# Patient Record
Sex: FEMALE | Race: WHITE | Employment: FULL TIME | URBAN - METROPOLITAN AREA
[De-identification: names, ages, dates, MRNs, and addresses within clinical notes are randomized per-mention and may not be internally consistent; named-entity substitution may affect disease eponyms.]

---

## 2023-06-22 ENCOUNTER — HOSPITAL ENCOUNTER (OUTPATIENT)
Facility: HOSPITAL | Age: 48
Discharge: HOME | End: 2023-06-22
Payer: COMMERCIAL

## 2023-06-22 DIAGNOSIS — R10.2 PELVIC PAIN IN FEMALE: ICD-10-CM

## 2023-06-22 LAB
BASOPHILS # BLD: 0 K/UL (ref 0–0.1)
BASOPHILS NFR BLD: 0 % (ref 0–2)
DIFFERENTIAL METHOD BLD: ABNORMAL
EOSINOPHIL # BLD: 0.1 K/UL (ref 0–0.4)
EOSINOPHIL NFR BLD: 2 % (ref 0–5)
ERYTHROCYTE [DISTWIDTH] IN BLOOD BY AUTOMATED COUNT: 11.9 % (ref 11.6–14.5)
HCT VFR BLD AUTO: 36.1 % (ref 35–45)
HGB BLD-MCNC: 11.9 G/DL (ref 12–16)
IMM GRANULOCYTES # BLD AUTO: 0 K/UL (ref 0–0.04)
IMM GRANULOCYTES NFR BLD AUTO: 0 % (ref 0–0.5)
LYMPHOCYTES # BLD: 2.3 K/UL (ref 0.9–3.6)
LYMPHOCYTES NFR BLD: 38 % (ref 21–52)
MCH RBC QN AUTO: 29.5 PG (ref 24–34)
MCHC RBC AUTO-ENTMCNC: 33 G/DL (ref 31–37)
MCV RBC AUTO: 89.6 FL (ref 78–100)
MONOCYTES # BLD: 0.6 K/UL (ref 0.05–1.2)
MONOCYTES NFR BLD: 10 % (ref 3–10)
NEUTS SEG # BLD: 2.9 K/UL (ref 1.8–8)
NEUTS SEG NFR BLD: 48 % (ref 40–73)
NRBC # BLD: 0 K/UL (ref 0–0.01)
NRBC BLD-RTO: 0 PER 100 WBC
PLATELET # BLD AUTO: 209 K/UL (ref 135–420)
PMV BLD AUTO: 10.4 FL (ref 9.2–11.8)
RBC # BLD AUTO: 4.03 M/UL (ref 4.2–5.3)
WBC # BLD AUTO: 6.1 K/UL (ref 4.6–13.2)

## 2023-06-22 PROCEDURE — 85025 COMPLETE CBC W/AUTO DIFF WBC: CPT

## 2023-06-22 PROCEDURE — 36415 COLL VENOUS BLD VENIPUNCTURE: CPT

## 2023-07-07 ENCOUNTER — ANESTHESIA EVENT (OUTPATIENT)
Facility: HOSPITAL | Age: 48
End: 2023-07-07
Payer: COMMERCIAL

## 2023-07-10 ENCOUNTER — ANESTHESIA (OUTPATIENT)
Facility: HOSPITAL | Age: 48
End: 2023-07-10
Payer: COMMERCIAL

## 2023-07-10 ENCOUNTER — HOSPITAL ENCOUNTER (OUTPATIENT)
Facility: HOSPITAL | Age: 48
Setting detail: OUTPATIENT SURGERY
Discharge: HOME | End: 2023-07-10
Attending: OBSTETRICS & GYNECOLOGY | Admitting: OBSTETRICS & GYNECOLOGY
Payer: COMMERCIAL

## 2023-07-10 VITALS
RESPIRATION RATE: 18 BRPM | HEART RATE: 62 BPM | SYSTOLIC BLOOD PRESSURE: 111 MMHG | TEMPERATURE: 97.9 F | OXYGEN SATURATION: 100 % | DIASTOLIC BLOOD PRESSURE: 65 MMHG | WEIGHT: 170.2 LBS | BODY MASS INDEX: 25.79 KG/M2 | HEIGHT: 68 IN

## 2023-07-10 LAB
ABO + RH BLD: NORMAL
BLOOD GROUP ANTIBODIES SERPL: NORMAL
HCG UR QL: NEGATIVE
SPECIMEN EXP DATE BLD: NORMAL

## 2023-07-10 PROCEDURE — 7100000000 HC PACU RECOVERY - FIRST 15 MIN: Performed by: OBSTETRICS & GYNECOLOGY

## 2023-07-10 PROCEDURE — 3700000000 HC ANESTHESIA ATTENDED CARE: Performed by: OBSTETRICS & GYNECOLOGY

## 2023-07-10 PROCEDURE — 86900 BLOOD TYPING SEROLOGIC ABO: CPT

## 2023-07-10 PROCEDURE — 6360000002 HC RX W HCPCS: Performed by: OBSTETRICS & GYNECOLOGY

## 2023-07-10 PROCEDURE — 2709999900 HC NON-CHARGEABLE SUPPLY: Performed by: OBSTETRICS & GYNECOLOGY

## 2023-07-10 PROCEDURE — 2500000003 HC RX 250 WO HCPCS: Performed by: NURSE ANESTHETIST, CERTIFIED REGISTERED

## 2023-07-10 PROCEDURE — 2720000010 HC SURG SUPPLY STERILE: Performed by: OBSTETRICS & GYNECOLOGY

## 2023-07-10 PROCEDURE — 6360000002 HC RX W HCPCS: Performed by: NURSE ANESTHETIST, CERTIFIED REGISTERED

## 2023-07-10 PROCEDURE — A4217 STERILE WATER/SALINE, 500 ML: HCPCS | Performed by: OBSTETRICS & GYNECOLOGY

## 2023-07-10 PROCEDURE — 88307 TISSUE EXAM BY PATHOLOGIST: CPT

## 2023-07-10 PROCEDURE — 86850 RBC ANTIBODY SCREEN: CPT

## 2023-07-10 PROCEDURE — 6360000002 HC RX W HCPCS: Performed by: STUDENT IN AN ORGANIZED HEALTH CARE EDUCATION/TRAINING PROGRAM

## 2023-07-10 PROCEDURE — 2580000003 HC RX 258: Performed by: OBSTETRICS & GYNECOLOGY

## 2023-07-10 PROCEDURE — 36415 COLL VENOUS BLD VENIPUNCTURE: CPT

## 2023-07-10 PROCEDURE — 3700000001 HC ADD 15 MINUTES (ANESTHESIA): Performed by: OBSTETRICS & GYNECOLOGY

## 2023-07-10 PROCEDURE — 81025 URINE PREGNANCY TEST: CPT

## 2023-07-10 PROCEDURE — 2580000003 HC RX 258: Performed by: NURSE ANESTHETIST, CERTIFIED REGISTERED

## 2023-07-10 PROCEDURE — 3600000005 HC SURGERY LEVEL 5 BASE: Performed by: OBSTETRICS & GYNECOLOGY

## 2023-07-10 PROCEDURE — 6370000000 HC RX 637 (ALT 250 FOR IP): Performed by: OBSTETRICS & GYNECOLOGY

## 2023-07-10 PROCEDURE — 3600000015 HC SURGERY LEVEL 5 ADDTL 15MIN: Performed by: OBSTETRICS & GYNECOLOGY

## 2023-07-10 PROCEDURE — 7100000010 HC PHASE II RECOVERY - FIRST 15 MIN: Performed by: OBSTETRICS & GYNECOLOGY

## 2023-07-10 PROCEDURE — 86901 BLOOD TYPING SEROLOGIC RH(D): CPT

## 2023-07-10 PROCEDURE — 7100000011 HC PHASE II RECOVERY - ADDTL 15 MIN: Performed by: OBSTETRICS & GYNECOLOGY

## 2023-07-10 PROCEDURE — 7100000001 HC PACU RECOVERY - ADDTL 15 MIN: Performed by: OBSTETRICS & GYNECOLOGY

## 2023-07-10 RX ORDER — HYDROMORPHONE HYDROCHLORIDE 1 MG/ML
0.5 INJECTION, SOLUTION INTRAMUSCULAR; INTRAVENOUS; SUBCUTANEOUS EVERY 5 MIN PRN
Status: DISCONTINUED | OUTPATIENT
Start: 2023-07-10 | End: 2023-07-10 | Stop reason: HOSPADM

## 2023-07-10 RX ORDER — SODIUM CHLORIDE, SODIUM LACTATE, POTASSIUM CHLORIDE, CALCIUM CHLORIDE 600; 310; 30; 20 MG/100ML; MG/100ML; MG/100ML; MG/100ML
INJECTION, SOLUTION INTRAVENOUS CONTINUOUS
Status: DISCONTINUED | OUTPATIENT
Start: 2023-07-10 | End: 2023-07-10 | Stop reason: HOSPADM

## 2023-07-10 RX ORDER — HYDROMORPHONE HYDROCHLORIDE 1 MG/ML
INJECTION, SOLUTION INTRAMUSCULAR; INTRAVENOUS; SUBCUTANEOUS PRN
Status: DISCONTINUED | OUTPATIENT
Start: 2023-07-10 | End: 2023-07-10 | Stop reason: SDUPTHER

## 2023-07-10 RX ORDER — SODIUM CHLORIDE 9 MG/ML
INJECTION, SOLUTION INTRAVENOUS PRN
Status: DISCONTINUED | OUTPATIENT
Start: 2023-07-10 | End: 2023-07-10 | Stop reason: HOSPADM

## 2023-07-10 RX ORDER — ONDANSETRON 2 MG/ML
INJECTION INTRAMUSCULAR; INTRAVENOUS PRN
Status: DISCONTINUED | OUTPATIENT
Start: 2023-07-10 | End: 2023-07-10 | Stop reason: SDUPTHER

## 2023-07-10 RX ORDER — OXYCODONE HYDROCHLORIDE 5 MG/1
10 TABLET ORAL EVERY 4 HOURS PRN
Status: DISCONTINUED | OUTPATIENT
Start: 2023-07-10 | End: 2023-07-10 | Stop reason: HOSPADM

## 2023-07-10 RX ORDER — MIDAZOLAM HYDROCHLORIDE 1 MG/ML
INJECTION INTRAMUSCULAR; INTRAVENOUS PRN
Status: DISCONTINUED | OUTPATIENT
Start: 2023-07-10 | End: 2023-07-10 | Stop reason: SDUPTHER

## 2023-07-10 RX ORDER — MAGNESIUM HYDROXIDE/ALUMINUM HYDROXICE/SIMETHICONE 120; 1200; 1200 MG/30ML; MG/30ML; MG/30ML
30 SUSPENSION ORAL EVERY 6 HOURS PRN
Status: DISCONTINUED | OUTPATIENT
Start: 2023-07-10 | End: 2023-07-10 | Stop reason: HOSPADM

## 2023-07-10 RX ORDER — DEXAMETHASONE SODIUM PHOSPHATE 4 MG/ML
INJECTION, SOLUTION INTRA-ARTICULAR; INTRALESIONAL; INTRAMUSCULAR; INTRAVENOUS; SOFT TISSUE PRN
Status: DISCONTINUED | OUTPATIENT
Start: 2023-07-10 | End: 2023-07-10 | Stop reason: SDUPTHER

## 2023-07-10 RX ORDER — FENTANYL CITRATE 50 UG/ML
INJECTION, SOLUTION INTRAMUSCULAR; INTRAVENOUS PRN
Status: DISCONTINUED | OUTPATIENT
Start: 2023-07-10 | End: 2023-07-10 | Stop reason: SDUPTHER

## 2023-07-10 RX ORDER — KETOROLAC TROMETHAMINE 15 MG/ML
INJECTION, SOLUTION INTRAMUSCULAR; INTRAVENOUS PRN
Status: DISCONTINUED | OUTPATIENT
Start: 2023-07-10 | End: 2023-07-10 | Stop reason: SDUPTHER

## 2023-07-10 RX ORDER — SODIUM CHLORIDE 0.9 % (FLUSH) 0.9 %
5-40 SYRINGE (ML) INJECTION PRN
Status: DISCONTINUED | OUTPATIENT
Start: 2023-07-10 | End: 2023-07-10 | Stop reason: HOSPADM

## 2023-07-10 RX ORDER — DROPERIDOL 2.5 MG/ML
0.62 INJECTION, SOLUTION INTRAMUSCULAR; INTRAVENOUS
Status: COMPLETED | OUTPATIENT
Start: 2023-07-10 | End: 2023-07-10

## 2023-07-10 RX ORDER — ONDANSETRON 2 MG/ML
4 INJECTION INTRAMUSCULAR; INTRAVENOUS EVERY 6 HOURS PRN
Status: DISCONTINUED | OUTPATIENT
Start: 2023-07-10 | End: 2023-07-10 | Stop reason: HOSPADM

## 2023-07-10 RX ORDER — ONDANSETRON 2 MG/ML
4 INJECTION INTRAMUSCULAR; INTRAVENOUS
Status: DISCONTINUED | OUTPATIENT
Start: 2023-07-10 | End: 2023-07-10 | Stop reason: HOSPADM

## 2023-07-10 RX ORDER — METHYLENE BLUE 10 MG/ML
INJECTION INTRAVENOUS PRN
Status: DISCONTINUED | OUTPATIENT
Start: 2023-07-10 | End: 2023-07-10 | Stop reason: SDUPTHER

## 2023-07-10 RX ORDER — OXYCODONE HYDROCHLORIDE 5 MG/1
5 TABLET ORAL EVERY 4 HOURS PRN
Status: DISCONTINUED | OUTPATIENT
Start: 2023-07-10 | End: 2023-07-10 | Stop reason: HOSPADM

## 2023-07-10 RX ORDER — SODIUM CHLORIDE 0.9 % (FLUSH) 0.9 %
5-40 SYRINGE (ML) INJECTION EVERY 12 HOURS SCHEDULED
Status: DISCONTINUED | OUTPATIENT
Start: 2023-07-10 | End: 2023-07-10 | Stop reason: HOSPADM

## 2023-07-10 RX ORDER — FENTANYL CITRATE 50 UG/ML
50 INJECTION, SOLUTION INTRAMUSCULAR; INTRAVENOUS EVERY 5 MIN PRN
Status: DISCONTINUED | OUTPATIENT
Start: 2023-07-10 | End: 2023-07-10 | Stop reason: HOSPADM

## 2023-07-10 RX ORDER — LIDOCAINE HYDROCHLORIDE 20 MG/ML
INJECTION, SOLUTION EPIDURAL; INFILTRATION; INTRACAUDAL; PERINEURAL PRN
Status: DISCONTINUED | OUTPATIENT
Start: 2023-07-10 | End: 2023-07-10 | Stop reason: SDUPTHER

## 2023-07-10 RX ORDER — IBUPROFEN 400 MG/1
600 TABLET ORAL EVERY 8 HOURS SCHEDULED
Status: DISCONTINUED | OUTPATIENT
Start: 2023-07-10 | End: 2023-07-10 | Stop reason: HOSPADM

## 2023-07-10 RX ORDER — ROCURONIUM BROMIDE 10 MG/ML
INJECTION, SOLUTION INTRAVENOUS PRN
Status: DISCONTINUED | OUTPATIENT
Start: 2023-07-10 | End: 2023-07-10 | Stop reason: SDUPTHER

## 2023-07-10 RX ORDER — DOCUSATE SODIUM 100 MG/1
100 CAPSULE, LIQUID FILLED ORAL 2 TIMES DAILY
Status: DISCONTINUED | OUTPATIENT
Start: 2023-07-10 | End: 2023-07-10 | Stop reason: HOSPADM

## 2023-07-10 RX ORDER — ONDANSETRON 4 MG/1
4 TABLET, ORALLY DISINTEGRATING ORAL EVERY 8 HOURS PRN
Status: DISCONTINUED | OUTPATIENT
Start: 2023-07-10 | End: 2023-07-10 | Stop reason: HOSPADM

## 2023-07-10 RX ORDER — HYDROMORPHONE HYDROCHLORIDE 1 MG/ML
0.5 INJECTION, SOLUTION INTRAMUSCULAR; INTRAVENOUS; SUBCUTANEOUS
Status: DISCONTINUED | OUTPATIENT
Start: 2023-07-10 | End: 2023-07-10 | Stop reason: HOSPADM

## 2023-07-10 RX ORDER — SODIUM CHLORIDE, SODIUM LACTATE, POTASSIUM CHLORIDE, CALCIUM CHLORIDE 600; 310; 30; 20 MG/100ML; MG/100ML; MG/100ML; MG/100ML
INJECTION, SOLUTION INTRAVENOUS CONTINUOUS PRN
Status: DISCONTINUED | OUTPATIENT
Start: 2023-07-10 | End: 2023-07-10 | Stop reason: SDUPTHER

## 2023-07-10 RX ORDER — BUPIVACAINE HYDROCHLORIDE 2.5 MG/ML
INJECTION, SOLUTION EPIDURAL; INFILTRATION; INTRACAUDAL PRN
Status: DISCONTINUED | OUTPATIENT
Start: 2023-07-10 | End: 2023-07-10 | Stop reason: ALTCHOICE

## 2023-07-10 RX ORDER — GLYCOPYRROLATE 0.2 MG/ML
INJECTION INTRAMUSCULAR; INTRAVENOUS PRN
Status: DISCONTINUED | OUTPATIENT
Start: 2023-07-10 | End: 2023-07-10 | Stop reason: SDUPTHER

## 2023-07-10 RX ORDER — MAGNESIUM HYDROXIDE 1200 MG/15ML
LIQUID ORAL CONTINUOUS PRN
Status: COMPLETED | OUTPATIENT
Start: 2023-07-10 | End: 2023-07-10

## 2023-07-10 RX ORDER — PROPOFOL 10 MG/ML
INJECTION, EMULSION INTRAVENOUS PRN
Status: DISCONTINUED | OUTPATIENT
Start: 2023-07-10 | End: 2023-07-10 | Stop reason: SDUPTHER

## 2023-07-10 RX ORDER — LABETALOL HYDROCHLORIDE 5 MG/ML
10 INJECTION, SOLUTION INTRAVENOUS
Status: DISCONTINUED | OUTPATIENT
Start: 2023-07-10 | End: 2023-07-10 | Stop reason: HOSPADM

## 2023-07-10 RX ORDER — MIDAZOLAM HYDROCHLORIDE 2 MG/2ML
2 INJECTION, SOLUTION INTRAMUSCULAR; INTRAVENOUS
Status: DISCONTINUED | OUTPATIENT
Start: 2023-07-10 | End: 2023-07-10 | Stop reason: HOSPADM

## 2023-07-10 RX ORDER — DIPHENHYDRAMINE HYDROCHLORIDE 50 MG/ML
12.5 INJECTION INTRAMUSCULAR; INTRAVENOUS
Status: DISCONTINUED | OUTPATIENT
Start: 2023-07-10 | End: 2023-07-10 | Stop reason: HOSPADM

## 2023-07-10 RX ADMIN — OXYCODONE HYDROCHLORIDE 5 MG: 5 TABLET ORAL at 12:26

## 2023-07-10 RX ADMIN — ONDANSETRON HYDROCHLORIDE 4 MG: 2 INJECTION INTRAMUSCULAR; INTRAVENOUS at 10:44

## 2023-07-10 RX ADMIN — GLYCOPYRROLATE 0.2 MG: 0.2 INJECTION, SOLUTION INTRAMUSCULAR; INTRAVENOUS at 09:26

## 2023-07-10 RX ADMIN — SODIUM CHLORIDE, SODIUM LACTATE, POTASSIUM CHLORIDE, AND CALCIUM CHLORIDE: 600; 310; 30; 20 INJECTION, SOLUTION INTRAVENOUS at 10:08

## 2023-07-10 RX ADMIN — KETOROLAC TROMETHAMINE 30 MG: 15 INJECTION, SOLUTION INTRAMUSCULAR; INTRAVENOUS at 10:44

## 2023-07-10 RX ADMIN — Medication 2000 MG: at 09:07

## 2023-07-10 RX ADMIN — METHYLENE BLUE 100 MG: 10 INJECTION INTRAVENOUS at 10:21

## 2023-07-10 RX ADMIN — SODIUM CHLORIDE, SODIUM LACTATE, POTASSIUM CHLORIDE, AND CALCIUM CHLORIDE: 600; 310; 30; 20 INJECTION, SOLUTION INTRAVENOUS at 08:55

## 2023-07-10 RX ADMIN — GLYCOPYRROLATE 0.2 MG: 0.2 INJECTION, SOLUTION INTRAMUSCULAR; INTRAVENOUS at 09:40

## 2023-07-10 RX ADMIN — FENTANYL CITRATE 25 MCG: 50 INJECTION, SOLUTION INTRAMUSCULAR; INTRAVENOUS at 10:44

## 2023-07-10 RX ADMIN — DROPERIDOL 0.62 MG: 2.5 INJECTION, SOLUTION INTRAMUSCULAR; INTRAVENOUS at 11:48

## 2023-07-10 RX ADMIN — ROCURONIUM BROMIDE 50 MG: 10 INJECTION, SOLUTION INTRAVENOUS at 09:05

## 2023-07-10 RX ADMIN — HYDROMORPHONE HYDROCHLORIDE 0.5 MG: 1 INJECTION, SOLUTION INTRAMUSCULAR; INTRAVENOUS; SUBCUTANEOUS at 08:55

## 2023-07-10 RX ADMIN — ROCURONIUM BROMIDE 20 MG: 10 INJECTION, SOLUTION INTRAVENOUS at 10:18

## 2023-07-10 RX ADMIN — DEXAMETHASONE SODIUM PHOSPHATE 4 MG: 4 INJECTION, SOLUTION INTRAMUSCULAR; INTRAVENOUS at 09:04

## 2023-07-10 RX ADMIN — PROPOFOL 150 MG: 10 INJECTION, EMULSION INTRAVENOUS at 09:04

## 2023-07-10 RX ADMIN — FENTANYL CITRATE 25 MCG: 50 INJECTION, SOLUTION INTRAMUSCULAR; INTRAVENOUS at 09:53

## 2023-07-10 RX ADMIN — SUGAMMADEX 200 MG: 100 INJECTION, SOLUTION INTRAVENOUS at 10:33

## 2023-07-10 RX ADMIN — SODIUM CHLORIDE, POTASSIUM CHLORIDE, SODIUM LACTATE AND CALCIUM CHLORIDE: 600; 310; 30; 20 INJECTION, SOLUTION INTRAVENOUS at 11:49

## 2023-07-10 RX ADMIN — FENTANYL CITRATE 50 MCG: 50 INJECTION, SOLUTION INTRAMUSCULAR; INTRAVENOUS at 09:22

## 2023-07-10 RX ADMIN — LIDOCAINE HYDROCHLORIDE 60 MG: 20 INJECTION, SOLUTION EPIDURAL; INFILTRATION; INTRACAUDAL; PERINEURAL at 09:04

## 2023-07-10 RX ADMIN — MIDAZOLAM 2 MG: 1 INJECTION INTRAMUSCULAR; INTRAVENOUS at 08:55

## 2023-07-10 RX ADMIN — HYDROMORPHONE HYDROCHLORIDE 0.5 MG: 1 INJECTION, SOLUTION INTRAMUSCULAR; INTRAVENOUS; SUBCUTANEOUS at 09:26

## 2023-07-10 ASSESSMENT — PAIN - FUNCTIONAL ASSESSMENT
PAIN_FUNCTIONAL_ASSESSMENT: 0-10
PAIN_FUNCTIONAL_ASSESSMENT: PREVENTS OR INTERFERES SOME ACTIVE ACTIVITIES AND ADLS
PAIN_FUNCTIONAL_ASSESSMENT: PREVENTS OR INTERFERES SOME ACTIVE ACTIVITIES AND ADLS

## 2023-07-10 ASSESSMENT — PAIN DESCRIPTION - PAIN TYPE
TYPE: SURGICAL PAIN
TYPE: SURGICAL PAIN

## 2023-07-10 ASSESSMENT — PAIN DESCRIPTION - DESCRIPTORS
DESCRIPTORS: CRAMPING;ACHING
DESCRIPTORS: CRAMPING
DESCRIPTORS: CRAMPING

## 2023-07-10 ASSESSMENT — PAIN DESCRIPTION - FREQUENCY: FREQUENCY: INTERMITTENT

## 2023-07-10 ASSESSMENT — PAIN SCALES - GENERAL
PAINLEVEL_OUTOF10: 4
PAINLEVEL_OUTOF10: 5
PAINLEVEL_OUTOF10: 2
PAINLEVEL_OUTOF10: 0
PAINLEVEL_OUTOF10: 5

## 2023-07-10 ASSESSMENT — PAIN DESCRIPTION - LOCATION
LOCATION: ABDOMEN

## 2023-07-10 NOTE — PERIOP NOTE
Patient voided 200 mls of clear blue/gray urine due to methylene blue dye injection, without problems.

## 2023-07-10 NOTE — BRIEF OP NOTE
Brief Postoperative Note      Patient: Mendoza Segovia  YOB: 1975  MRN: [id number]    Date of Procedure: 7/10/2023    Pre-Op Diagnosis: menometteraghia    Post-Op Diagnosis: Same       Procedure(s):  LAPAROSCOPICALLY ASSISTED VAGINAL HYSTERECTOMY WITH BILATERAL SALPINGECTOMY/cystoscopy    Surgeon(s):  Andria Mandujano MD    Assistant:as per or record      Anesthesia: General    Estimated Blood Loss (mL): 926     Complications: None    Specimens:   ID Type Source Tests Collected by Time Destination   A : CERVIX, UTERUS, RIGHT FALLOPIAN TUBE Tissue Uterus SURGICAL PATHOLOGY Andria Mandujano MD 7/10/2023 7569        Implants:  * No implants in log *      Drains:   [REMOVED] NG/OG/NJ/NE Tube Orogastric 18 fr Center mouth (Removed)       [REMOVED] Urinary Catheter 07/10/23 2 Way (Removed)   Catheter Best Practices  Drainage tube clipped to bed;Catheter secured to thigh; Tamper seal intact; Bag below bladder;Drainage bag less than half full;Lack of dependent loop in tubing;Bag not on floor 07/10/23 0924       Findings: left tube absent possible left ov remnant right ovary tube normal /normal cystoscopy    Electronically signed by Suad Lopez MD on 7/10/2023 at 10:46 AM

## 2023-07-10 NOTE — PERIOP NOTE
Reviewed PTA medication list with patient/caregiver and patient/caregiver denies any additional medications. Patient admits to having a responsible adult care for them at home for at least 24 hours after surgery. Patient encouraged to use gown warming system and informed that using said warming gown to regulate body temperature prior to a procedure has been shown to help reduce the risks of blood clots and infection. Patient's pharmacy of choice verified and documented in PTA medication section. Dual skin assessment & fall risk band verification completed with A Jaswant UMANZOR.

## 2023-07-10 NOTE — DISCHARGE INSTRUCTIONS
stomach: Take your medicine after meals (unless your doctor has told you not to). Ask your doctor for a different pain medicine. If your doctor prescribed antibiotics, take them as directed. Do not stop taking them just because you feel better. You need to take the full course of antibiotics. Incision care    You may have stitches over the cuts (incisions) the doctor made in your belly. If you have strips of tape on the incisions the doctor made, leave the tape on for a week or until it falls off. Or follow your doctor's instructions for removing the tape. Wash the area daily with warm, soapy water, and pat it dry. Don't use hydrogen peroxide or alcohol, which can slow healing. You may cover the area with a gauze bandage if it weeps or rubs against clothing. Change the bandage every day. Keep the area clean and dry. Other instructions    You may have some light vaginal bleeding. Wear sanitary pads if needed. Do not douche or use tampons. Follow-up care is a key part of your treatment and safety. Be sure to make and go to all appointments, and call your doctor if you are having problems. It's also a good idea to know your test results and keep a list of the medicines you take. When should you call for help? Call 911 anytime you think you may need emergency care. For example, call if:    You passed out (lost consciousness). You have chest pain, are short of breath, or cough up blood. Call your doctor now or seek immediate medical care if:    You have pain that does not get better after you take pain medicine. You cannot pass stools or gas. You have vaginal discharge that has increased in amount or smells bad. You are sick to your stomach or cannot drink fluids. You have loose stitches, or your incision comes open. Bright red blood has soaked through the bandage over your incision. You have signs of infection, such as:   Increased pain, swelling, warmth, or

## 2023-07-10 NOTE — PERIOP NOTE
TRANSFER - IN REPORT:    Verbal report received from OR RN on Germán Brizuela  being received from OR for routine progression of patient care      Report consisted of patient's Situation, Background, Assessment and   Recommendations(SBAR). Information from the following report(s) Adult Overview, Surgery Report, Intake/Output, and MAR was reviewed with the receiving nurse. Opportunity for questions and clarification was provided. Assessment completed upon patient's arrival to unit and care assumed.

## 2023-07-10 NOTE — PERIOP NOTE
TRANSFER - IN REPORT:    Verbal report received from [facility] on [facility]  being received from PACU for routine post-op      Report consisted of patient's Situation, Background, Assessment and   Recommendations(SBAR). Information from the following report(s) Nurse Handoff Report, Surgery Report, Intake/Output, and MAR was reviewed with the receiving nurse. Opportunity for questions and clarification was provided. Assessment completed upon patient's arrival to unit and care assumed.

## 2023-07-10 NOTE — ANESTHESIA POSTPROCEDURE EVALUATION
Department of Anesthesiology  Postprocedure Note    Patient: Germán Brizuela  MRN: [id number]  YOB: 1975  Date of evaluation: 7/10/2023      Procedure Summary     Date: 07/10/23 Room / Location: THE [facility] 04 / [facility] MAIN OR    Anesthesia Start: 0900 Anesthesia Stop: 1055    Procedure: LAPAROSCOPICALLY ASSISTED VAGINAL HYSTERECTOMY WITH RIGHT SALPINGECTOMY (Abdomen/Perineum) Diagnosis:       Perineal neuralgia, unspecified laterality      Excessive or frequent menstruation      (Perineal neuralgia, unspecified laterality [R10.2])      (Excessive or frequent menstruation [N92.0])    Surgeons: Kriss Ni MD Responsible Provider: Terell Bruner MD    Anesthesia Type: General ASA Status: 2          Anesthesia Type: General    Rosibel Phase I: Rosibel Score: 9    Rosibel Phase II: Rosibel Score: 10      Anesthesia Post Evaluation    Patient location during evaluation: PACU  Patient participation: complete - patient participated  Level of consciousness: awake and alert  Airway patency: patent  Nausea & Vomiting: no nausea and no vomiting  Complications: no  Cardiovascular status: blood pressure returned to baseline  Respiratory status: acceptable  Hydration status: euvolemic

## 2023-07-10 NOTE — PERIOP NOTE
TRANSFER - OUT REPORT:    Verbal report given to [facility] RN on Lalit Garrison  being transferred to Phase 2 for routine progression of patient care       Report consisted of patient's Situation, Background, Assessment and   Recommendations(SBAR). Information from the following report(s) Adult Overview, Surgery Report, Intake/Output, and MAR was reviewed with the receiving nurse. Lines:   Peripheral IV 07/10/23 Right; Anterior Forearm (Active)   Site Assessment Clean, dry & intact 07/10/23 1109   Line Status Infusing 07/10/23 1109   Phlebitis Assessment No symptoms 07/10/23 1109   Infiltration Assessment 0 07/10/23 1109   Alcohol Cap Used No 07/10/23 0827   Dressing Status Clean, dry & intact 07/10/23 1109   Dressing Type Transparent 07/10/23 1109        Opportunity for questions and clarification was provided.       Patient transported with:  [employer]

## 2023-07-10 NOTE — PERIOP NOTE
0.625 mg droperidol given IV push per MD order for c/o nausea.  at her side, and call bell within reach.

## 2023-07-10 NOTE — H&P
Update History & Physical    The patient's History and Physical of July 10, 2023 was reviewed with the patient and I examined the patient. There was no change. The surgical site was confirmed by the patient and me. Endometrial biopsy was benign    Plan: The risks, benefits, expected outcome, and alternative to the recommended procedure have been discussed with the patient. Patient understands and wants to proceed with the procedure.      Electronically signed by Pedrito March MD on 7/10/2023 at 8:54 AM

## 2023-07-11 NOTE — OP NOTE
[facility]  OPERATIVE REPORT    Name:  Jose Fry  MR#:   [id number]  :  1975  ACCOUNT #:  [de-identified]  DATE OF SERVICE:  07/10/2023    PREOPERATIVE DIAGNOSIS:  Menometrorrhagia. POSTOPERATIVE DIAGNOSIS:  Menometrorrhagia. PROCEDURE PERFORMED:  Laparoscopic-assisted vaginal hysterectomy, bilateral salpingectomy, and cystoscopy. SURGEON:  Yvette Fitzgerald MD    ASSISTANT:  none    ANESTHESIA:  General.    COMPLICATIONS:  none  none  SPECIMENS REMOVED:  Uterus and bilateral tubes. IMPLANTS:  No implant. DRAINS:  No drains. ESTIMATED BLOOD LOSS:  250 mL. PROCEDURE:  The patient was taken to the operating room, was prepped and draped in dorsal lithotomy position. When an adequate level of general anesthesia was obtained, Mathur catheter was placed. Weighted speculum was placed in the vagina. Akira Randy was used to visualize the cervix which was grasped with a double-tooth tenaculum. [employer] manipulator was placed. Attention was drawn to the anterior abdominal wall where a subumbilical incision was made. A Veress needle was inserted such that pneumoperitoneum could be created. Once an adequate pneumoperitoneum was created, a 5-mm trocar with sleeve was placed under direct visualization through the laparoscope. The patient was placed in Trendelenburg. A second and third puncture site was made in the left and right lower quadrants respectively. Through these, 5-mm trocars with sleeves were placed under direct visualization through the laparoscope. Manipulation of the pelvis revealed top normal-sized uterus. Normal ovaries, normal tubes. Using a bipolar electrocautery, the right tube was resected off of the ovary and sidewall to the level of the cornua. This was done on the left side as well. Right round ligament with tripolar excised and bladder flap was created from the right side to the left without any difficulty.   The utero-ovarian ligaments were tripolar

## 2025-01-28 ENCOUNTER — HOSPITAL ENCOUNTER (OUTPATIENT)
Facility: HOSPITAL | Age: 50
Setting detail: SPECIMEN
Discharge: HOME | End: 2025-01-31

## 2025-01-28 ENCOUNTER — HOSPITAL ENCOUNTER (OUTPATIENT)
Facility: HOSPITAL | Age: 50
Discharge: HOME | End: 2025-01-30
Payer: COMMERCIAL

## 2025-01-28 DIAGNOSIS — M54.42 ACUTE BACK PAIN WITH SCIATICA, LEFT: ICD-10-CM

## 2025-01-28 DIAGNOSIS — M54.16 LUMBAR RADICULOPATHY: ICD-10-CM

## 2025-01-28 LAB — LABCORP SPECIMEN COLLECTION: NORMAL

## 2025-01-28 PROCEDURE — 99001 SPECIMEN HANDLING PT-LAB: CPT

## 2025-01-28 PROCEDURE — 93005 ELECTROCARDIOGRAM TRACING: CPT

## 2025-01-30 LAB
EKG ATRIAL RATE: 61 BPM
EKG DIAGNOSIS: NORMAL
EKG P AXIS: 76 DEGREES
EKG P-R INTERVAL: 144 MS
EKG Q-T INTERVAL: 432 MS
EKG QRS DURATION: 84 MS
EKG QTC CALCULATION (BAZETT): 434 MS
EKG R AXIS: 67 DEGREES
EKG T AXIS: 63 DEGREES
EKG VENTRICULAR RATE: 61 BPM

## 2025-02-26 ENCOUNTER — HOSPITAL ENCOUNTER (INPATIENT)
Facility: HOSPITAL | Age: 50
LOS: 1 days | Discharge: HOME | DRG: 552 | End: 2025-03-01
Attending: EMERGENCY MEDICINE | Admitting: HOSPITALIST
Payer: COMMERCIAL

## 2025-02-26 ENCOUNTER — APPOINTMENT (OUTPATIENT)
Facility: HOSPITAL | Age: 50
DRG: 552 | End: 2025-02-26
Payer: COMMERCIAL

## 2025-02-26 DIAGNOSIS — M51.26 LUMBAR DISC HERNIATION: ICD-10-CM

## 2025-02-26 DIAGNOSIS — M54.9 INTRACTABLE BACK PAIN: Primary | ICD-10-CM

## 2025-02-26 DIAGNOSIS — R52 INTRACTABLE PAIN: ICD-10-CM

## 2025-02-26 LAB
ALBUMIN SERPL-MCNC: 3.5 G/DL (ref 3.4–5)
ALBUMIN/GLOB SERPL: 1.1 (ref 0.8–1.7)
ALP SERPL-CCNC: 61 U/L (ref 45–117)
ALT SERPL-CCNC: 13 U/L (ref 13–56)
ANION GAP SERPL CALC-SCNC: 7 MMOL/L (ref 3–18)
AST SERPL-CCNC: 13 U/L (ref 10–38)
BASOPHILS # BLD: 0.01 K/UL (ref 0–0.1)
BASOPHILS NFR BLD: 0.1 % (ref 0–2)
BILIRUB SERPL-MCNC: 1.1 MG/DL (ref 0.2–1)
BUN SERPL-MCNC: 17 MG/DL (ref 7–18)
BUN/CREAT SERPL: 27 (ref 12–20)
CALCIUM SERPL-MCNC: 8.9 MG/DL (ref 8.5–10.1)
CHLORIDE SERPL-SCNC: 109 MMOL/L (ref 100–111)
CO2 SERPL-SCNC: 26 MMOL/L (ref 21–32)
CREAT SERPL-MCNC: 0.64 MG/DL (ref 0.6–1.3)
DIFFERENTIAL METHOD BLD: ABNORMAL
EOSINOPHIL # BLD: 0.09 K/UL (ref 0–0.4)
EOSINOPHIL NFR BLD: 0.9 % (ref 0–5)
ERYTHROCYTE [DISTWIDTH] IN BLOOD BY AUTOMATED COUNT: 11.1 % (ref 11.6–14.5)
GLOBULIN SER CALC-MCNC: 3.3 G/DL (ref 2–4)
GLUCOSE SERPL-MCNC: 89 MG/DL (ref 74–99)
HCT VFR BLD AUTO: 37.8 % (ref 35–45)
HGB BLD-MCNC: 12.8 G/DL (ref 12–16)
IMM GRANULOCYTES # BLD AUTO: 0.04 K/UL (ref 0–0.04)
IMM GRANULOCYTES NFR BLD AUTO: 0.4 % (ref 0–0.5)
LYMPHOCYTES # BLD: 2.16 K/UL (ref 0.9–3.3)
LYMPHOCYTES NFR BLD: 21.3 % (ref 21–52)
MAGNESIUM SERPL-MCNC: 2 MG/DL (ref 1.6–2.6)
MCH RBC QN AUTO: 30.3 PG (ref 24–34)
MCHC RBC AUTO-ENTMCNC: 33.9 G/DL (ref 31–37)
MCV RBC AUTO: 89.6 FL (ref 78–100)
MONOCYTES # BLD: 1.06 K/UL (ref 0.05–1.2)
MONOCYTES NFR BLD: 10.5 % (ref 3–10)
NEUTS SEG # BLD: 6.78 K/UL (ref 1.8–8)
NEUTS SEG NFR BLD: 66.8 % (ref 40–73)
NRBC # BLD: 0 K/UL (ref 0–0.01)
NRBC BLD-RTO: 0 PER 100 WBC
PLATELET # BLD AUTO: 200 K/UL (ref 135–420)
PMV BLD AUTO: 9.9 FL (ref 9.2–11.8)
POTASSIUM SERPL-SCNC: 3.2 MMOL/L (ref 3.5–5.5)
PROCALCITONIN SERPL-MCNC: <0.05 NG/ML
PROT SERPL-MCNC: 6.8 G/DL (ref 6.4–8.2)
RBC # BLD AUTO: 4.22 M/UL (ref 4.2–5.3)
SODIUM SERPL-SCNC: 142 MMOL/L (ref 136–145)
WBC # BLD AUTO: 10.1 K/UL (ref 4.6–13.2)

## 2025-02-26 PROCEDURE — 6360000002 HC RX W HCPCS: Performed by: EMERGENCY MEDICINE

## 2025-02-26 PROCEDURE — 96372 THER/PROPH/DIAG INJ SC/IM: CPT

## 2025-02-26 PROCEDURE — G0378 HOSPITAL OBSERVATION PER HR: HCPCS

## 2025-02-26 PROCEDURE — 2500000003 HC RX 250 WO HCPCS: Performed by: HOSPITALIST

## 2025-02-26 PROCEDURE — 6370000000 HC RX 637 (ALT 250 FOR IP): Performed by: HOSPITALIST

## 2025-02-26 PROCEDURE — 84145 PROCALCITONIN (PCT): CPT

## 2025-02-26 PROCEDURE — 6360000002 HC RX W HCPCS: Performed by: HOSPITALIST

## 2025-02-26 PROCEDURE — 85025 COMPLETE CBC W/AUTO DIFF WBC: CPT

## 2025-02-26 PROCEDURE — 99285 EMERGENCY DEPT VISIT HI MDM: CPT

## 2025-02-26 PROCEDURE — 72158 MRI LUMBAR SPINE W/O & W/DYE: CPT

## 2025-02-26 PROCEDURE — 80053 COMPREHEN METABOLIC PANEL: CPT

## 2025-02-26 PROCEDURE — 83735 ASSAY OF MAGNESIUM: CPT

## 2025-02-26 PROCEDURE — A9577 INJ MULTIHANCE: HCPCS | Performed by: EMERGENCY MEDICINE

## 2025-02-26 PROCEDURE — 51798 US URINE CAPACITY MEASURE: CPT

## 2025-02-26 PROCEDURE — 96374 THER/PROPH/DIAG INJ IV PUSH: CPT

## 2025-02-26 PROCEDURE — 6360000004 HC RX CONTRAST MEDICATION: Performed by: EMERGENCY MEDICINE

## 2025-02-26 PROCEDURE — 96375 TX/PRO/DX INJ NEW DRUG ADDON: CPT

## 2025-02-26 PROCEDURE — 96376 TX/PRO/DX INJ SAME DRUG ADON: CPT

## 2025-02-26 RX ORDER — POTASSIUM CHLORIDE 1500 MG/1
40 TABLET, EXTENDED RELEASE ORAL ONCE
Status: COMPLETED | OUTPATIENT
Start: 2025-02-26 | End: 2025-02-26

## 2025-02-26 RX ORDER — HYDROMORPHONE HYDROCHLORIDE 1 MG/ML
1 INJECTION, SOLUTION INTRAMUSCULAR; INTRAVENOUS; SUBCUTANEOUS
Status: DISCONTINUED | OUTPATIENT
Start: 2025-02-26 | End: 2025-02-28

## 2025-02-26 RX ORDER — OXYCODONE HYDROCHLORIDE 5 MG/1
5 TABLET ORAL EVERY 4 HOURS PRN
Status: DISCONTINUED | OUTPATIENT
Start: 2025-02-26 | End: 2025-02-28

## 2025-02-26 RX ORDER — OXYCODONE HYDROCHLORIDE 5 MG/1
10 TABLET ORAL EVERY 4 HOURS PRN
Status: DISCONTINUED | OUTPATIENT
Start: 2025-02-26 | End: 2025-02-28

## 2025-02-26 RX ORDER — ACETAMINOPHEN 325 MG/1
650 TABLET ORAL EVERY 6 HOURS PRN
Status: DISCONTINUED | OUTPATIENT
Start: 2025-02-26 | End: 2025-03-01 | Stop reason: HOSPADM

## 2025-02-26 RX ORDER — GABAPENTIN 100 MG/1
100 CAPSULE ORAL 2 TIMES DAILY
Status: ON HOLD | COMMUNITY
Start: 2025-02-24 | End: 2025-02-28 | Stop reason: HOSPADM

## 2025-02-26 RX ORDER — ONDANSETRON 2 MG/ML
4 INJECTION INTRAMUSCULAR; INTRAVENOUS EVERY 6 HOURS PRN
Status: DISCONTINUED | OUTPATIENT
Start: 2025-02-26 | End: 2025-03-01 | Stop reason: HOSPADM

## 2025-02-26 RX ORDER — METHYLPREDNISOLONE 4 MG/1
4 TABLET ORAL SEE ADMIN INSTRUCTIONS
Status: ON HOLD | COMMUNITY
Start: 2025-02-25 | End: 2025-02-28 | Stop reason: HOSPADM

## 2025-02-26 RX ORDER — METHOCARBAMOL 500 MG/1
1000 TABLET, FILM COATED ORAL 3 TIMES DAILY
Status: DISCONTINUED | OUTPATIENT
Start: 2025-02-26 | End: 2025-02-27

## 2025-02-26 RX ORDER — GABAPENTIN 300 MG/1
300 CAPSULE ORAL 2 TIMES DAILY
Status: DISCONTINUED | OUTPATIENT
Start: 2025-02-26 | End: 2025-02-27

## 2025-02-26 RX ORDER — DEXAMETHASONE 4 MG/1
4 TABLET ORAL EVERY 12 HOURS SCHEDULED
Status: DISCONTINUED | OUTPATIENT
Start: 2025-02-26 | End: 2025-03-01 | Stop reason: HOSPADM

## 2025-02-26 RX ORDER — OXYCODONE AND ACETAMINOPHEN 5; 325 MG/1; MG/1
2 TABLET ORAL EVERY 6 HOURS PRN
Status: ON HOLD | COMMUNITY
Start: 2025-02-11 | End: 2025-02-28 | Stop reason: HOSPADM

## 2025-02-26 RX ORDER — HYDROMORPHONE HYDROCHLORIDE 1 MG/ML
1 INJECTION, SOLUTION INTRAMUSCULAR; INTRAVENOUS; SUBCUTANEOUS ONCE
Status: COMPLETED | OUTPATIENT
Start: 2025-02-26 | End: 2025-02-26

## 2025-02-26 RX ORDER — SODIUM CHLORIDE 9 MG/ML
INJECTION, SOLUTION INTRAVENOUS PRN
Status: DISCONTINUED | OUTPATIENT
Start: 2025-02-26 | End: 2025-03-01 | Stop reason: HOSPADM

## 2025-02-26 RX ORDER — LORAZEPAM 2 MG/ML
2 INJECTION INTRAMUSCULAR ONCE
Status: COMPLETED | OUTPATIENT
Start: 2025-02-26 | End: 2025-02-26

## 2025-02-26 RX ORDER — ONDANSETRON 4 MG/1
4 TABLET, ORALLY DISINTEGRATING ORAL EVERY 8 HOURS PRN
Status: DISCONTINUED | OUTPATIENT
Start: 2025-02-26 | End: 2025-03-01 | Stop reason: HOSPADM

## 2025-02-26 RX ORDER — SODIUM CHLORIDE 0.9 % (FLUSH) 0.9 %
5-40 SYRINGE (ML) INJECTION EVERY 12 HOURS SCHEDULED
Status: DISCONTINUED | OUTPATIENT
Start: 2025-02-26 | End: 2025-03-01 | Stop reason: HOSPADM

## 2025-02-26 RX ORDER — SODIUM CHLORIDE 0.9 % (FLUSH) 0.9 %
5-40 SYRINGE (ML) INJECTION PRN
Status: DISCONTINUED | OUTPATIENT
Start: 2025-02-26 | End: 2025-03-01 | Stop reason: HOSPADM

## 2025-02-26 RX ORDER — ACETAMINOPHEN 650 MG/1
650 SUPPOSITORY RECTAL EVERY 6 HOURS PRN
Status: DISCONTINUED | OUTPATIENT
Start: 2025-02-26 | End: 2025-03-01 | Stop reason: HOSPADM

## 2025-02-26 RX ORDER — DIAZEPAM 10 MG/2ML
5 INJECTION, SOLUTION INTRAMUSCULAR; INTRAVENOUS ONCE
Status: COMPLETED | OUTPATIENT
Start: 2025-02-26 | End: 2025-02-26

## 2025-02-26 RX ORDER — POLYETHYLENE GLYCOL 3350 17 G/17G
17 POWDER, FOR SOLUTION ORAL DAILY PRN
Status: DISCONTINUED | OUTPATIENT
Start: 2025-02-26 | End: 2025-03-01 | Stop reason: HOSPADM

## 2025-02-26 RX ORDER — ENOXAPARIN SODIUM 100 MG/ML
40 INJECTION SUBCUTANEOUS DAILY
Status: DISCONTINUED | OUTPATIENT
Start: 2025-02-26 | End: 2025-03-01 | Stop reason: HOSPADM

## 2025-02-26 RX ORDER — ACETAMINOPHEN 500 MG
1000 TABLET ORAL 3 TIMES DAILY
Status: DISCONTINUED | OUTPATIENT
Start: 2025-02-26 | End: 2025-03-01 | Stop reason: HOSPADM

## 2025-02-26 RX ORDER — CYCLOBENZAPRINE HCL 10 MG
10 TABLET ORAL 3 TIMES DAILY PRN
Status: ON HOLD | COMMUNITY
Start: 2025-02-11 | End: 2025-02-26

## 2025-02-26 RX ADMIN — SODIUM CHLORIDE, PRESERVATIVE FREE 10 ML: 5 INJECTION INTRAVENOUS at 20:59

## 2025-02-26 RX ADMIN — ENOXAPARIN SODIUM 40 MG: 100 INJECTION SUBCUTANEOUS at 18:03

## 2025-02-26 RX ADMIN — HYDROMORPHONE HYDROCHLORIDE 1 MG: 1 INJECTION, SOLUTION INTRAMUSCULAR; INTRAVENOUS; SUBCUTANEOUS at 08:11

## 2025-02-26 RX ADMIN — ACETAMINOPHEN 1000 MG: 500 TABLET ORAL at 18:03

## 2025-02-26 RX ADMIN — METHOCARBAMOL 1000 MG: 500 TABLET ORAL at 20:54

## 2025-02-26 RX ADMIN — METHOCARBAMOL 1000 MG: 500 TABLET ORAL at 18:03

## 2025-02-26 RX ADMIN — HYDROMORPHONE HYDROCHLORIDE 1 MG: 1 INJECTION, SOLUTION INTRAMUSCULAR; INTRAVENOUS; SUBCUTANEOUS at 18:03

## 2025-02-26 RX ADMIN — ACETAMINOPHEN 1000 MG: 500 TABLET ORAL at 20:54

## 2025-02-26 RX ADMIN — LORAZEPAM 2 MG: 2 INJECTION INTRAMUSCULAR; INTRAVENOUS at 16:26

## 2025-02-26 RX ADMIN — POTASSIUM CHLORIDE 40 MEQ: 1500 TABLET, EXTENDED RELEASE ORAL at 18:03

## 2025-02-26 RX ADMIN — GADOBENATE DIMEGLUMINE 20 ML: 529 INJECTION, SOLUTION INTRAVENOUS at 11:16

## 2025-02-26 RX ADMIN — DIAZEPAM 5 MG: 5 INJECTION, SOLUTION INTRAMUSCULAR; INTRAVENOUS at 14:54

## 2025-02-26 RX ADMIN — DEXAMETHASONE 4 MG: 4 TABLET ORAL at 20:57

## 2025-02-26 RX ADMIN — HYDROMORPHONE HYDROCHLORIDE 1 MG: 1 INJECTION, SOLUTION INTRAMUSCULAR; INTRAVENOUS; SUBCUTANEOUS at 09:25

## 2025-02-26 RX ADMIN — HYDROMORPHONE HYDROCHLORIDE 1 MG: 1 INJECTION, SOLUTION INTRAMUSCULAR; INTRAVENOUS; SUBCUTANEOUS at 10:50

## 2025-02-26 RX ADMIN — GABAPENTIN 300 MG: 300 CAPSULE ORAL at 20:54

## 2025-02-26 ASSESSMENT — PAIN DESCRIPTION - DESCRIPTORS
DESCRIPTORS: SPASM;THROBBING
DESCRIPTORS: ACHING;THROBBING

## 2025-02-26 ASSESSMENT — PAIN - FUNCTIONAL ASSESSMENT
PAIN_FUNCTIONAL_ASSESSMENT: ACTIVITIES ARE NOT PREVENTED
PAIN_FUNCTIONAL_ASSESSMENT: PREVENTS OR INTERFERES SOME ACTIVE ACTIVITIES AND ADLS

## 2025-02-26 ASSESSMENT — PAIN SCALES - GENERAL
PAINLEVEL_OUTOF10: 3
PAINLEVEL_OUTOF10: 5
PAINLEVEL_OUTOF10: 10
PAINLEVEL_OUTOF10: 6
PAINLEVEL_OUTOF10: 2
PAINLEVEL_OUTOF10: 5
PAINLEVEL_OUTOF10: 0
PAINLEVEL_OUTOF10: 10
PAINLEVEL_OUTOF10: 7

## 2025-02-26 ASSESSMENT — PAIN DESCRIPTION - FREQUENCY
FREQUENCY: CONTINUOUS
FREQUENCY: CONTINUOUS

## 2025-02-26 ASSESSMENT — PAIN DESCRIPTION - ORIENTATION
ORIENTATION: MID;LOWER
ORIENTATION: LOWER;MID

## 2025-02-26 ASSESSMENT — PAIN DESCRIPTION - ONSET
ONSET: GRADUAL
ONSET: GRADUAL

## 2025-02-26 ASSESSMENT — PAIN DESCRIPTION - LOCATION
LOCATION: BACK
LOCATION: BACK

## 2025-02-26 ASSESSMENT — PAIN DESCRIPTION - PAIN TYPE
TYPE: ACUTE PAIN
TYPE: CHRONIC PAIN

## 2025-02-26 NOTE — ED TRIAGE NOTES
Patient arrives to ED with report of back pain, patient is post op 2/19 for L4-5 discectomy by Dr. Corona at [facility]. Patient was given 50mcg fentanyl by EMS PTA for 10/10 pain, 20 LAC.

## 2025-02-26 NOTE — CONSULTS
Consult Note    Patient: April Clements               Sex: female          DOA: 2025         YOB: 1975      Age:  50 y.o.        LOS:  LOS: 0 days              HPI:     April Clements is a 50 y.o. female who has been seen for right side low back pain. S/p Left l4-5 microdiskectomy 25.   Did well for 2 days then began with pain in her back and buttocks on the right.  No numbness, weakness in the lower extremities.  Difficulty walking, changing positions due to back pain and now comes to ER.      Past Medical History:   Diagnosis Date    Advance directive discussed with patient 2023    denies having one    Exercise tolerance finding 2023    pt states able to climb 2 flights of stairs without CP or SOB    Rheumatoid arthritis (HCC) 2023    pt denies- stated she seen a specilist and it was ruled out around     Thyroid disease 2023    Wears glasses 2023       Past Surgical History:   Procedure Laterality Date    APPENDECTOMY       SECTION      HYSTERECTOMY, VAGINAL N/A 7/10/2023    LAPAROSCOPICALLY ASSISTED VAGINAL HYSTERECTOMY WITH RIGHT SALPINGECTOMY performed by Milagro Hunter MD at [facility] MAIN OR    TUMOR REMOVAL      tumor removed from ovary right side       No family history on file.    Social History     Socioeconomic History    Marital status:    Tobacco Use    Smoking status: Never     Passive exposure: Never    Smokeless tobacco: Never   Vaping Use    Vaping status: Never Used   Substance and Sexual Activity    Alcohol use: Yes     Comment: social, special occasions    Drug use: Never    Sexual activity: Yes     Partners: Male       Prior to Admission medications    Medication Sig Start Date End Date Taking? Authorizing Provider   levothyroxine (SYNTHROID) 25 MCG tablet Take 1 tablet by mouth at bedtime    Provider, MD Armani   Cholecalciferol (VITAMIN D) 50 MCG ( UT) CAPS capsule Take by mouth    Provider,

## 2025-02-26 NOTE — ED PROVIDER NOTES
EMERGENCY DEPARTMENT HISTORY AND PHYSICAL EXAM      Date: 2/26/2025  Patient Name: April Clements    History of Presenting Illness     Chief Complaint   Patient presents with    Back Pain    Post-op Problem       History Provided By: Patient    HPI: April Clements, 50 y.o. female with PMHx as noted below presents the emergency department with complaints of back pain.  Patient with recent discectomy, had been recovering at home postop able to ambulate with walker however the last 2 days has noted increasing pain on the right side of her back, now unable to move or walk.  Also reports some bilateral leg numbness.    Pt denies any other alleviating or exacerbating factors. Additionally, pt specifically denies any recent fever, chills, headache, vomiting  PCP: Mikaela Jackson MD    Current Facility-Administered Medications   Medication Dose Route Frequency Provider Last Rate Last Admin    sodium chloride flush 0.9 % injection 5-40 mL  5-40 mL IntraVENous 2 times per day Desirae Covington,         sodium chloride flush 0.9 % injection 5-40 mL  5-40 mL IntraVENous PRN Desirae Covington,         0.9 % sodium chloride infusion   IntraVENous PRN Desirae Covington,         enoxaparin (LOVENOX) injection 40 mg  40 mg SubCUTAneous Daily Desirae Covington DO        ondansetron (ZOFRAN-ODT) disintegrating tablet 4 mg  4 mg Oral Q8H PRN Desirae Covington DO        Or    ondansetron (ZOFRAN) injection 4 mg  4 mg IntraVENous Q6H PRN Desirae Covington,         polyethylene glycol (GLYCOLAX) packet 17 g  17 g Oral Daily PRN Desirae Covington DO        acetaminophen (TYLENOL) tablet 650 mg  650 mg Oral Q6H PRN Desirae Covington DO        Or    acetaminophen (TYLENOL) suppository 650 mg  650 mg Rectal Q6H PRN Desirae Covington,         acetaminophen (TYLENOL) tablet 1,000 mg  1,000 mg Oral TID Desirae Covington DO        methocarbamol (ROBAXIN) tablet 1,000 mg  1,000 mg Oral TID Desirae Covington DO        oxyCODONE (ROXICODONE) immediate release tablet 5               CONSULTS: (Who and What was discussed)  IP CONSULT TO ORTHOPEDIC SURGERY  IP CONSULT TO HOSPITALIST      CONSULT:  Pollo Posey MD spoke with the hospitalist.  Discussed HPI and PE, available diagnostic tests and clinical findings. He is in agreement with care plans as outlined and will evaluate for admission     Admit Note  Patient is being admitted to the hospitalist.  The results of their tests and reasons for their admission have been discussed with them and/or available family.  They convey agreement and understanding for the need to be admitted and for their admission diagnosis.  Consultation has been made with the inpatient physician specialist for hospitalization.    Disposition:  admission    PLAN:  1. Admit     Diagnosis     Clinical Impression:   1. Intractable pain        I, Pollo Posey MD am the first provider for this patient and am the attending of record for this patient encounter.    Pollo Posey MD    Please note that this dictation was completed with Dragon, computer voice recognition software.  Quite often unanticipated grammatical, syntax, homophones, and other interpretive errors are inadvertently transcribed by the computer software.  Please disregard these errors.  Additionally, please excuse any errors that have escaped final proofreading.           Pollo Posey MD  02/26/25 0249

## 2025-02-26 NOTE — H&P
History & Physical      Patient: April Clements MRN: [id number]  [facility]: [id number]    YOB: 1975  Age: 50 y.o.  Sex: female      DOA: 2/26/2025  Chief Complaint:   Chief Complaint   Patient presents with    Back Pain    Post-op Problem       Active Hospital Problems    Diagnosis Date Noted    Intractable back pain [M54.9] 02/26/2025    Lumbar disc herniation [M51.26] 02/26/2025          Assessment & Plan    # Intractable lumbar back pain  # Recent L4-5 laminectomy    MRI L-spine:   \"Recent L4-5 laminectomy.  Small recurrent disc herniation in L subarticular zone, causing impingement on the left lateral recess & Associated fluid collection surrounding this, extending along the left side of thecal sac, the left side of the spinous process, &  into the sub-Q tissues. Mild bilateral neural foraminal narrowing here.   Mild edema in the endplates surrounding R side of L4-5  (reactive Vs postsurgical; less likely infxn).\"    - Spine surgeon Dr Corona has seen/assessed the patient; believes it is muscle spasm/inflammatory    - admitted for pain control, PT/OT    - multi-modal pain control w/  TID Tylenol, Robaxin, Gabapentin, prn oxycodone    - PO Decadron  (recently Rx'd prednisone o/p by spine surg)    - PT, OT evals;  fall precautions    - monitor for any infectious sx; though doubt infxn at this time.  Fluid collection likely post op finding.    # Mild hypokalemia-  replete, monitor      Chronic medical issues:    # Hypothyroidism- continue synthroid  # Obesity:  BMI = 31.  Patient would benefit from lifestyle/dietary modifications    Code Status:  Full code.   Updated pt's sisters and  at bedside.  DVT Prophylaxis:  [x]Lovenox  []Hep SQ   [] Eliquis, Xarelto   []Coumadin  []Heparin Drip   []SCDs   Case discussed with:  [x]Patient  [x]Family [] Consultants  [x]Nursing  []Case Management  Expect the patient needing more than 2 midnights stay Yes []  No [x]        History of Present

## 2025-02-26 NOTE — ED NOTES
Patient states she spoke with surgeon who upped her gababpentin and oxycontin but stated the nerves are inflamed and that she would be in pain.

## 2025-02-26 NOTE — ED NOTES
ED TO INPATIENT SBAR HANDOFF    Patient Name: April Clements   Preferred Name: April  : 1975  50 y.o.   Family/Caregiver Present: no   Code Status Order: Full Code  PO Status: NPO:Yes  Telemetry Order: Yes  C-SSRS: Risk of Suicide: No Risk  Sitter no     Restraints:     Sepsis Risk Score      Situation  Chief Complaint   Patient presents with    Back Pain    Post-op Problem     Brief Description of Patient's Condition: intractable back pain s/p surgery   Mental Status: oriented  Arrived from:Home  Imaging:   MRI LUMBAR SPINE W WO CONTRAST   Final Result      1. Status post left laminotomy at L4-5. There is a small recurrent disc   herniation in the left subarticular zone causing impingement on the left lateral   recess. There is an associated fluid collection surrounding this extending along   the left side of the thecal sac, the left side of the spinous process, and into   the subcutaneous tissues. Mild bilateral neural foraminal narrowing is also   present at this level.   2. Mild edema in the endplates surrounding the right side of L4-5. This may be   reactive or postsurgical. Infection is considered unlikely given the   lateralization, but cannot be entirely excluded.   3. Mild left neural femoral narrowing at L3-4.      Electronically signed by MOHINDER MONTERO        Abnormal labs:   Abnormal Labs Reviewed   COMPREHENSIVE METABOLIC PANEL - Abnormal; Notable for the following components:       Result Value    Potassium 3.2 (*)     BUN/Creatinine Ratio 27 (*)     Total Bilirubin 1.1 (*)     All other components within normal limits   CBC WITH AUTO DIFFERENTIAL - Abnormal; Notable for the following components:    RDW 11.1 (*)     Monocytes % 10.5 (*)     All other components within normal limits       Background  Allergies: No Known Allergies  History:   Past Medical History:   Diagnosis Date    Advance directive discussed with patient 2023    denies having one    Exercise tolerance finding

## 2025-02-27 LAB
ANION GAP SERPL CALC-SCNC: 3 MMOL/L (ref 3–18)
BUN SERPL-MCNC: 24 MG/DL (ref 7–18)
BUN/CREAT SERPL: 39 (ref 12–20)
CALCIUM SERPL-MCNC: 9.1 MG/DL (ref 8.5–10.1)
CHLORIDE SERPL-SCNC: 109 MMOL/L (ref 100–111)
CO2 SERPL-SCNC: 25 MMOL/L (ref 21–32)
CREAT SERPL-MCNC: 0.61 MG/DL (ref 0.6–1.3)
ERYTHROCYTE [DISTWIDTH] IN BLOOD BY AUTOMATED COUNT: 11.4 % (ref 11.6–14.5)
GLUCOSE SERPL-MCNC: 134 MG/DL (ref 74–99)
HCT VFR BLD AUTO: 35.5 % (ref 35–45)
HGB BLD-MCNC: 12 G/DL (ref 12–16)
MCH RBC QN AUTO: 30.5 PG (ref 24–34)
MCHC RBC AUTO-ENTMCNC: 33.8 G/DL (ref 31–37)
MCV RBC AUTO: 90.3 FL (ref 78–100)
NRBC # BLD: 0 K/UL (ref 0–0.01)
NRBC BLD-RTO: 0 PER 100 WBC
PLATELET # BLD AUTO: 179 K/UL (ref 135–420)
PMV BLD AUTO: 9.5 FL (ref 9.2–11.8)
POTASSIUM SERPL-SCNC: 4.8 MMOL/L (ref 3.5–5.5)
RBC # BLD AUTO: 3.93 M/UL (ref 4.2–5.3)
SODIUM SERPL-SCNC: 137 MMOL/L (ref 136–145)
WBC # BLD AUTO: 6.1 K/UL (ref 4.6–13.2)

## 2025-02-27 PROCEDURE — 2500000003 HC RX 250 WO HCPCS: Performed by: HOSPITALIST

## 2025-02-27 PROCEDURE — 6360000002 HC RX W HCPCS: Performed by: HOSPITALIST

## 2025-02-27 PROCEDURE — 96376 TX/PRO/DX INJ SAME DRUG ADON: CPT

## 2025-02-27 PROCEDURE — 80048 BASIC METABOLIC PNL TOTAL CA: CPT

## 2025-02-27 PROCEDURE — G0378 HOSPITAL OBSERVATION PER HR: HCPCS

## 2025-02-27 PROCEDURE — 97162 PT EVAL MOD COMPLEX 30 MIN: CPT

## 2025-02-27 PROCEDURE — 96372 THER/PROPH/DIAG INJ SC/IM: CPT

## 2025-02-27 PROCEDURE — 97530 THERAPEUTIC ACTIVITIES: CPT

## 2025-02-27 PROCEDURE — 97165 OT EVAL LOW COMPLEX 30 MIN: CPT

## 2025-02-27 PROCEDURE — 97535 SELF CARE MNGMENT TRAINING: CPT

## 2025-02-27 PROCEDURE — 6370000000 HC RX 637 (ALT 250 FOR IP): Performed by: HOSPITALIST

## 2025-02-27 PROCEDURE — 36415 COLL VENOUS BLD VENIPUNCTURE: CPT

## 2025-02-27 PROCEDURE — 85027 COMPLETE CBC AUTOMATED: CPT

## 2025-02-27 PROCEDURE — 97116 GAIT TRAINING THERAPY: CPT

## 2025-02-27 RX ORDER — GABAPENTIN 300 MG/1
300 CAPSULE ORAL 3 TIMES DAILY
Status: DISCONTINUED | OUTPATIENT
Start: 2025-02-27 | End: 2025-03-01 | Stop reason: HOSPADM

## 2025-02-27 RX ORDER — METHOCARBAMOL 500 MG/1
1500 TABLET, FILM COATED ORAL 3 TIMES DAILY
Status: DISCONTINUED | OUTPATIENT
Start: 2025-02-27 | End: 2025-03-01 | Stop reason: HOSPADM

## 2025-02-27 RX ADMIN — ACETAMINOPHEN 1000 MG: 500 TABLET ORAL at 08:38

## 2025-02-27 RX ADMIN — ENOXAPARIN SODIUM 40 MG: 100 INJECTION SUBCUTANEOUS at 08:38

## 2025-02-27 RX ADMIN — HYDROMORPHONE HYDROCHLORIDE 1 MG: 1 INJECTION, SOLUTION INTRAMUSCULAR; INTRAVENOUS; SUBCUTANEOUS at 08:39

## 2025-02-27 RX ADMIN — METHOCARBAMOL 1000 MG: 500 TABLET ORAL at 08:38

## 2025-02-27 RX ADMIN — HYDROMORPHONE HYDROCHLORIDE 1 MG: 1 INJECTION, SOLUTION INTRAMUSCULAR; INTRAVENOUS; SUBCUTANEOUS at 21:48

## 2025-02-27 RX ADMIN — GABAPENTIN 300 MG: 300 CAPSULE ORAL at 14:46

## 2025-02-27 RX ADMIN — SODIUM CHLORIDE, PRESERVATIVE FREE 10 ML: 5 INJECTION INTRAVENOUS at 20:22

## 2025-02-27 RX ADMIN — ACETAMINOPHEN 1000 MG: 500 TABLET ORAL at 20:20

## 2025-02-27 RX ADMIN — HYDROMORPHONE HYDROCHLORIDE 1 MG: 1 INJECTION, SOLUTION INTRAMUSCULAR; INTRAVENOUS; SUBCUTANEOUS at 14:47

## 2025-02-27 RX ADMIN — SODIUM CHLORIDE, PRESERVATIVE FREE 10 ML: 5 INJECTION INTRAVENOUS at 08:38

## 2025-02-27 RX ADMIN — METHOCARBAMOL 1500 MG: 500 TABLET ORAL at 20:21

## 2025-02-27 RX ADMIN — DEXAMETHASONE 4 MG: 4 TABLET ORAL at 20:22

## 2025-02-27 RX ADMIN — GABAPENTIN 300 MG: 300 CAPSULE ORAL at 20:20

## 2025-02-27 RX ADMIN — OXYCODONE HYDROCHLORIDE 10 MG: 5 TABLET ORAL at 06:15

## 2025-02-27 RX ADMIN — METHOCARBAMOL 1500 MG: 500 TABLET ORAL at 14:47

## 2025-02-27 RX ADMIN — GABAPENTIN 300 MG: 300 CAPSULE ORAL at 08:38

## 2025-02-27 RX ADMIN — DEXAMETHASONE 4 MG: 4 TABLET ORAL at 08:38

## 2025-02-27 RX ADMIN — ACETAMINOPHEN 1000 MG: 500 TABLET ORAL at 14:47

## 2025-02-27 ASSESSMENT — PAIN DESCRIPTION - PAIN TYPE
TYPE: ACUTE PAIN

## 2025-02-27 ASSESSMENT — PAIN DESCRIPTION - FREQUENCY
FREQUENCY: CONTINUOUS

## 2025-02-27 ASSESSMENT — PAIN SCALES - GENERAL
PAINLEVEL_OUTOF10: 3
PAINLEVEL_OUTOF10: 6
PAINLEVEL_OUTOF10: 5
PAINLEVEL_OUTOF10: 7
PAINLEVEL_OUTOF10: 3
PAINLEVEL_OUTOF10: 7
PAINLEVEL_OUTOF10: 5
PAINLEVEL_OUTOF10: 7
PAINLEVEL_OUTOF10: 3
PAINLEVEL_OUTOF10: 0

## 2025-02-27 ASSESSMENT — PAIN DESCRIPTION - LOCATION
LOCATION: BACK
LOCATION: BACK
LOCATION: LEG
LOCATION: BACK
LOCATION: LEG
LOCATION: BACK;LEG

## 2025-02-27 ASSESSMENT — PAIN DESCRIPTION - DESCRIPTORS
DESCRIPTORS: ACHING
DESCRIPTORS: SPASM
DESCRIPTORS: SPASM;THROBBING
DESCRIPTORS: SPASM
DESCRIPTORS: ACHING

## 2025-02-27 ASSESSMENT — PAIN DESCRIPTION - ONSET
ONSET: GRADUAL
ONSET: ON-GOING
ONSET: GRADUAL
ONSET: ON-GOING

## 2025-02-27 ASSESSMENT — PAIN - FUNCTIONAL ASSESSMENT
PAIN_FUNCTIONAL_ASSESSMENT: ACTIVITIES ARE NOT PREVENTED
PAIN_FUNCTIONAL_ASSESSMENT: PREVENTS OR INTERFERES SOME ACTIVE ACTIVITIES AND ADLS
PAIN_FUNCTIONAL_ASSESSMENT: ACTIVITIES ARE NOT PREVENTED
PAIN_FUNCTIONAL_ASSESSMENT: PREVENTS OR INTERFERES SOME ACTIVE ACTIVITIES AND ADLS

## 2025-02-27 ASSESSMENT — PAIN DESCRIPTION - ORIENTATION
ORIENTATION: MID;LOWER
ORIENTATION: RIGHT;LEFT
ORIENTATION: LEFT;RIGHT
ORIENTATION: LOWER;MID

## 2025-02-27 ASSESSMENT — PAIN SCALES - WONG BAKER
WONGBAKER_NUMERICALRESPONSE: HURTS A LITTLE BIT
WONGBAKER_NUMERICALRESPONSE: HURTS A LITTLE BIT

## 2025-02-27 NOTE — PROGRESS NOTES
2/27/2025 PT note: consult received and chart reviewed.  Evaluation attempted. Pt currently eating.  Will f/u at later time as pt schedule allows for PT evaluation. Thank you for this referral.   Padmaja, PT

## 2025-02-27 NOTE — PROGRESS NOTES
Occupational Therapy Goals:  Initiated 2/27/2025 to be met within 7-10 days.  Short Term Goals  Time Frame for Short Term Goals: 7 days  Short Term Goal 1: The patient will demonstrate ability to complete LB dressing tasks at supervision level with use of AE as needed.  Short Term Goal 2: The patient will demonstrate ability to complete LB bathing tasks at supervision level with use of AE as needed.  Short Term Goal 3: The patient will demonstrate ability to complete toilet transfers at supervision level.  Short Term Goal 4: The patient will demonstrate ability to complete toileting tasks at supervision level.  Short Term Goal 5: The patient will demonstrate ability to complete grooming tasks standing at sink at supervision level.    OCCUPATIONAL THERAPY EVALUATION    Patient: April Clements (50 y.o. female)  Date: 2/27/2025  Primary Diagnosis: Intractable pain [R52]  Intractable back pain [M54.9]       Precautions: Fall Risk,  ,  ,  ,  , Spinal Precautions: No Bending, No Lifting, No Twisting,  ,    PLOF: Pt was independent in ADLs and ambulated independently without AD.     ASSESSMENT : ASSESSMENT : Pt cleared for therapy evaluation by RN. Upon therapist's arrival, pt was seated in arm rest chair reporting 5/10 pain in back. Pt was agreeable to occupational therapy evaluation. OT educated pt regarding the role of occupational therapy. Pt verbalized 100% understanding. OT educated pt regarding spinal precautions. Pt verbalized 100% understanding. OT educated pt regarding use of adaptive ADL strategies and use of AE to improve ease and safety with dressing tasks. Pt was provided with hip kit in order to improve ease with dressing tasks. Following demonstration, pt verbalized 100% understanding regarding use of AE. Pt demonstrated ability to don sock onto L foot with supervision using sock aid. OT educated pt regarding recommendations for use of 3 in 1 commode at home in order to improve ease/safety with    None (Mod I to I)   Putting on and taking off regular lower body clothing?   [] 1 [] 2 [x] 3 [] 4   2. Bathing (including washing, rinsing,      drying)?    [] 1 [] 2 [x] 3 [] 4   3. Toileting, which includes using toilet, bedpan or urinal?   [] 1 [] 2 [x] 3 [] 4   4. Putting on and taking off regular upper body clothing?   [] 1 [] 2 [x] 3 [] 4   5. Taking care of personal grooming such as brushing teeth?   [] 1 [] 2 [x] 3 [] 4   6. Eating meals?   [] 1 [] 2 [] 3 [x] 4       Current research shows that an AM-PAC score of 18 or greater is associated with a discharge to the patient's home setting.  Based on an AM-PAC score of 19/24 and their current ADL deficits; it is recommended that the patient have 2-3 sessions per week of Occupational Therapy at d/c to increase the patient's independence.

## 2025-02-27 NOTE — PROGRESS NOTES
Patient up and around room with significant other. Tolerated movement well. No other needs at this time. Call light in reach.

## 2025-02-27 NOTE — CARE COORDINATION
02/27/25 0850   Service Assessment   Patient Orientation Alert and Oriented;Person;Place;Situation;Self   Cognition Alert   History Provided By Patient   Primary Caregiver Self   Accompanied By/Relationship  present at bedside   Support Systems Spouse/Significant Other   Patient's Healthcare Decision Maker is: Legal Next of Kin   PCP Verified by CM Yes  ([facility])   Prior Functional Level Independent in ADLs/IADLs   Current Functional Level Independent in ADLs/IADLs   Can patient return to prior living arrangement Yes   Ability to make needs known: Good   Family able to assist with home care needs: Yes   Would you like for me to discuss the discharge plan with any other family members/significant others, and if so, who? Yes   Financial Resources None   Community Resources None   Social/Functional History   Lives With Spouse   Type of Home House   Home Layout Able to Live on Main level with bedroom/bathroom;Two level   Bathroom Accessibility Accessible   Home Equipment None   Receives Help From Family   Prior Level of Assist for ADLs Independent   Prior Level of Assist for Homemaking Independent   Homemaking Responsibilities Yes   Ambulation Assistance Independent   Prior Level of Assist for Transfers Independent   Active  Yes   Mode of Transportation Car   Occupation Full time employment   Type of Occupation Dental assistant   Discharge Planning   Type of Residence House   Living Arrangements Spouse/Significant Other   Current Services Prior To Admission None   Potential Assistance Needed N/A   DME Ordered? No   Potential Assistance Purchasing Medications No   Type of Home Care Services None   Patient expects to be discharged to: House   Follow Up Appointment: Best Day/Time  Monday AM   One/Two Story Residence Two story, live on first floor   History of falls? 0   Services At/After Discharge   Transition of Care Consult (CM Consult) N/A   Services At/After Discharge None   [place]  Resource Information Provided? No   Mode of Transport at Discharge Other (see comment)  (Spouse can provide transport home)   Confirm Follow Up Transport Family   Condition of Participation: Discharge Planning   The Plan for Transition of Care is related to the following treatment goals: Plan to discharge home with physician follow-up   The Patient and/or Patient Representative was provided with a Choice of Provider? Patient   The Patient and/Or Patient Representative agree with the Discharge Plan? Yes   Freedom of Choice list was provided with basic dialogue that supports the patient's individualized plan of care/goals, treatment preferences, and shares the quality data associated with the providers?  Yes     This CM met with patient with spouse at bedside. Patient reports that she is independent with all aspects including driving and works as a dental assistant. Patient states that her PCP left the practice and she has an appointment next week to establish with another physician at the practice, [facility], but she cannot remember the name of the physician. Patient states that she has a follow-up appointment scheduled with Dr. Corona on 3/3/25 at 11 AM. Patient would like to return home at discharge and  can assist transport home. Agreeable to plan to discharge to home with physician follow-up.

## 2025-02-27 NOTE — PROGRESS NOTES
Patient up to bathroom and back to bed. Family in room. No other needs at this time. Call light in reach.

## 2025-02-27 NOTE — PROGRESS NOTES
Physical Therapy Goals:  Initiated 2/27/2025 to be met within 7-10 days.  Short Term Goals  Short Term Goal 1: Patient will perform log rolling and supine to/from sit with suupervision/SBA.  Short Term Goal 2: Patient will demonstrate unsupported intact sitting balance EOB with S x 15 for performance of ADL.  Short Term Goal 3: Patient will perform sit to/from stand transfers with RW/S/SBA in prep for progressive gait training.  Short Term Goal 4: Patient will ambulate 150 ft with RW/SBA for increased functional mobility at discharge.  Short Term Goal 5: Patient will demonstrate step nego x 1 with RW/CGA/min A for home re-entry.      PHYSICAL THERAPY EVALUATION    Patient: April Clements (50 y.o. female)  Date: 2/27/2025 (late entry)  Primary Diagnosis: Intractable pain [R52]  Intractable back pain [M54.9]  Precautions: Fall Risk, Spinal Precautions: No Bending, No Lifting, No Twisting  PLOF: ambulating with RW since L4-L5 laminectomy ~1 week ago.  Uncontrolled pain and spasm limiting mobility at home and => current admission.    ASSESSMENT :  PT introduced self to pt, explained role and pt agreeable to participate with session. Pt found seated EOB with pt spouse and son visiting in room. Pt reports pain LB and R>L LE 5/10 (pain with intermittent spasm).  Reports L LE began hurting recently today.  Pt presents with decr'd BLE ROM and decr'd functional strength (3-/5 hip/knees), decr'd sitting/standing balance, as well as impaired bed mobility, transfers, and gait quality.  Pain also limiting function.  Pt STS with min A/RW. Able to participate with GT/RW ~85ft demonstrating slow, antalgic gt with decr'd step length and foot clearance.  Pt returned to room.  Educated in and performed log rolling for bed mobility tasks, completing same with min assist overall, except min/mod A sit to supine as pt unable to bring LE's onto bed.  Returned to sit EOB.  Dr. Covington arrived and pt seen by MD during session.  Pt continued

## 2025-02-27 NOTE — PROGRESS NOTES
2345 - Patient was bladder scanned, pt had 389 mls in bladder.    0234 - Orders was placed for straight cath.    0445 - Patient was able to ambulate to bedside commode; output was 350ml.

## 2025-02-27 NOTE — PROGRESS NOTES
Progress Note POD #      Patient: April Clements               Sex: female          DOA: 2/26/2025         YOB: 1975      Surgery:            LOS: 0 days               Subjective:     Much better today  No leg pain, minimal back pain    Objective:      Visit Vitals  /79   Pulse 87   Temp 98.4 °F (36.9 °C)   Resp 20   Ht 1.727 m (5' 8\")   Wt 93 kg (205 lb)   SpO2 98%   BMI 31.17 kg/m²       Physical Exam:  Neurological: motor strength: 5/5 in lower extremities bilaterally                          sensation: intact to light touch  Wound: C/D/I      Intake and Output:  Current Shift:  No intake/output data recorded.  Last three shifts:  02/25 1901 - 02/27 0700  In: -   Out: 350 [Urine:350]      Lab/Data Reviewed:  Lab Results   Component Value Date/Time    WBC 6.1 02/27/2025 02:22 AM    HGB 12.0 02/27/2025 02:22 AM    HCT 35.5 02/27/2025 02:22 AM     02/27/2025 02:22 AM    MCV 90.3 02/27/2025 02:22 AM     No results found for: \"APTT\"  No results found for: \"INR\", \"PT1\"   Recent Labs     02/26/25  1140 02/27/25  0222   HGB 12.8 12.0           Assessment/Plan     Principal Problem:    Intractable back pain  Active Problems:    Lumbar disc herniation  Resolved Problems:    * No resolved hospital problems. *      1. Stable  2. OOB with PT  3. D/C Planning  4. Follow-up in 5 [place] [facility] [place]. Corona.

## 2025-02-27 NOTE — CARE COORDINATION
Care Mgmt visited patient's room to review/discuss OBS Status.  Patient's , Ricky Clements present during visit.  Patient's  signed letter for patient, acknowledging understanding of letter and OBS Status.  Copy of letter given to patient's .  Original signed copy placed in patient's chart.

## 2025-02-28 PROCEDURE — 6370000000 HC RX 637 (ALT 250 FOR IP): Performed by: HOSPITALIST

## 2025-02-28 PROCEDURE — 2500000003 HC RX 250 WO HCPCS: Performed by: HOSPITALIST

## 2025-02-28 PROCEDURE — 96372 THER/PROPH/DIAG INJ SC/IM: CPT

## 2025-02-28 PROCEDURE — 97116 GAIT TRAINING THERAPY: CPT

## 2025-02-28 PROCEDURE — 6360000002 HC RX W HCPCS: Performed by: HOSPITALIST

## 2025-02-28 PROCEDURE — G0378 HOSPITAL OBSERVATION PER HR: HCPCS

## 2025-02-28 PROCEDURE — 96376 TX/PRO/DX INJ SAME DRUG ADON: CPT

## 2025-02-28 PROCEDURE — 1100000000 HC RM PRIVATE

## 2025-02-28 RX ORDER — POLYETHYLENE GLYCOL 3350 17 G/17G
17 POWDER, FOR SOLUTION ORAL DAILY PRN
Qty: 30 PACKET | Refills: 0 | Status: SHIPPED | OUTPATIENT
Start: 2025-02-28 | End: 2025-03-30

## 2025-02-28 RX ORDER — HYDRALAZINE HYDROCHLORIDE 20 MG/ML
10 INJECTION INTRAMUSCULAR; INTRAVENOUS EVERY 6 HOURS PRN
Status: DISCONTINUED | OUTPATIENT
Start: 2025-02-28 | End: 2025-03-01 | Stop reason: HOSPADM

## 2025-02-28 RX ORDER — DEXAMETHASONE 2 MG/1
TABLET ORAL
Qty: 9 TABLET | Refills: 0 | Status: SHIPPED | OUTPATIENT
Start: 2025-02-28 | End: 2025-03-01 | Stop reason: HOSPADM

## 2025-02-28 RX ORDER — OXYCODONE HYDROCHLORIDE 5 MG/1
10 TABLET ORAL EVERY 4 HOURS PRN
Status: DISCONTINUED | OUTPATIENT
Start: 2025-02-28 | End: 2025-03-01 | Stop reason: HOSPADM

## 2025-02-28 RX ORDER — LEVOTHYROXINE SODIUM 25 UG/1
25 TABLET ORAL NIGHTLY
Status: DISCONTINUED | OUTPATIENT
Start: 2025-02-28 | End: 2025-03-01 | Stop reason: HOSPADM

## 2025-02-28 RX ORDER — OXYCODONE HYDROCHLORIDE 10 MG/1
10 TABLET ORAL EVERY 4 HOURS PRN
Qty: 30 TABLET | Refills: 0 | Status: SHIPPED | OUTPATIENT
Start: 2025-02-28 | End: 2025-03-03

## 2025-02-28 RX ORDER — GABAPENTIN 300 MG/1
300 CAPSULE ORAL 3 TIMES DAILY
Qty: 90 CAPSULE | Refills: 0 | Status: SHIPPED | OUTPATIENT
Start: 2025-02-28 | End: 2025-03-30

## 2025-02-28 RX ORDER — METHOCARBAMOL 750 MG/1
1500 TABLET, FILM COATED ORAL 3 TIMES DAILY
Qty: 60 TABLET | Refills: 0 | Status: SHIPPED | OUTPATIENT
Start: 2025-02-28 | End: 2025-03-10

## 2025-02-28 RX ORDER — HYDROMORPHONE HYDROCHLORIDE 1 MG/ML
0.5 INJECTION, SOLUTION INTRAMUSCULAR; INTRAVENOUS; SUBCUTANEOUS
Status: DISCONTINUED | OUTPATIENT
Start: 2025-02-28 | End: 2025-03-01 | Stop reason: HOSPADM

## 2025-02-28 RX ADMIN — SODIUM CHLORIDE, PRESERVATIVE FREE 10 ML: 5 INJECTION INTRAVENOUS at 08:10

## 2025-02-28 RX ADMIN — OXYCODONE HYDROCHLORIDE 10 MG: 5 TABLET ORAL at 18:35

## 2025-02-28 RX ADMIN — GABAPENTIN 300 MG: 300 CAPSULE ORAL at 14:42

## 2025-02-28 RX ADMIN — ENOXAPARIN SODIUM 40 MG: 100 INJECTION SUBCUTANEOUS at 08:09

## 2025-02-28 RX ADMIN — METHOCARBAMOL 1500 MG: 500 TABLET ORAL at 14:41

## 2025-02-28 RX ADMIN — GABAPENTIN 300 MG: 300 CAPSULE ORAL at 22:04

## 2025-02-28 RX ADMIN — METHOCARBAMOL 1500 MG: 500 TABLET ORAL at 22:03

## 2025-02-28 RX ADMIN — SODIUM CHLORIDE, PRESERVATIVE FREE 10 ML: 5 INJECTION INTRAVENOUS at 22:06

## 2025-02-28 RX ADMIN — ACETAMINOPHEN 1000 MG: 500 TABLET ORAL at 08:09

## 2025-02-28 RX ADMIN — DEXAMETHASONE 4 MG: 4 TABLET ORAL at 22:04

## 2025-02-28 RX ADMIN — ACETAMINOPHEN 1000 MG: 500 TABLET ORAL at 22:03

## 2025-02-28 RX ADMIN — LEVOTHYROXINE SODIUM 25 MCG: 0.03 TABLET ORAL at 06:36

## 2025-02-28 RX ADMIN — METHOCARBAMOL 1500 MG: 500 TABLET ORAL at 08:09

## 2025-02-28 RX ADMIN — HYDROMORPHONE HYDROCHLORIDE 1 MG: 1 INJECTION, SOLUTION INTRAMUSCULAR; INTRAVENOUS; SUBCUTANEOUS at 08:12

## 2025-02-28 RX ADMIN — OXYCODONE HYDROCHLORIDE 10 MG: 5 TABLET ORAL at 14:42

## 2025-02-28 RX ADMIN — OXYCODONE HYDROCHLORIDE 10 MG: 5 TABLET ORAL at 05:19

## 2025-02-28 RX ADMIN — DEXAMETHASONE 4 MG: 4 TABLET ORAL at 08:09

## 2025-02-28 RX ADMIN — OXYCODONE HYDROCHLORIDE 10 MG: 5 TABLET ORAL at 22:35

## 2025-02-28 RX ADMIN — GABAPENTIN 300 MG: 300 CAPSULE ORAL at 08:09

## 2025-02-28 ASSESSMENT — PAIN SCALES - GENERAL
PAINLEVEL_OUTOF10: 8
PAINLEVEL_OUTOF10: 3
PAINLEVEL_OUTOF10: 8
PAINLEVEL_OUTOF10: 6
PAINLEVEL_OUTOF10: 3
PAINLEVEL_OUTOF10: 0
PAINLEVEL_OUTOF10: 3
PAINLEVEL_OUTOF10: 7
PAINLEVEL_OUTOF10: 0
PAINLEVEL_OUTOF10: 6

## 2025-02-28 ASSESSMENT — PAIN DESCRIPTION - LOCATION
LOCATION: BACK
LOCATION: BACK;LEG
LOCATION: BACK
LOCATION: BACK

## 2025-02-28 ASSESSMENT — PAIN SCALES - WONG BAKER
WONGBAKER_NUMERICALRESPONSE: HURTS WHOLE LOT
WONGBAKER_NUMERICALRESPONSE: HURTS WHOLE LOT
WONGBAKER_NUMERICALRESPONSE: HURTS A LITTLE BIT
WONGBAKER_NUMERICALRESPONSE: HURTS A LITTLE BIT

## 2025-02-28 ASSESSMENT — PAIN - FUNCTIONAL ASSESSMENT: PAIN_FUNCTIONAL_ASSESSMENT: PREVENTS OR INTERFERES SOME ACTIVE ACTIVITIES AND ADLS

## 2025-02-28 ASSESSMENT — PAIN DESCRIPTION - DESCRIPTORS
DESCRIPTORS: SPASM
DESCRIPTORS: ACHING
DESCRIPTORS: ACHING
DESCRIPTORS: SPASM

## 2025-02-28 ASSESSMENT — PAIN DESCRIPTION - PAIN TYPE
TYPE: ACUTE PAIN
TYPE: ACUTE PAIN

## 2025-02-28 ASSESSMENT — PAIN DESCRIPTION - ORIENTATION: ORIENTATION: LEFT;RIGHT

## 2025-02-28 NOTE — PROGRESS NOTES
Hospitalist Progress Note      Patient name: April Clements.  MRN: [id number]          PCP: Mikaela Jackson MD     Summary:          April Clements is a 50 y.o. female with PMHx of obesity, arthritis, hx of Lumbar Laminectomy 2/19/2025 by Dr Irineo Corona.   She presented to [facility] ED 2/26/2025 with intractable back pain with inability to ambulate.  States she was struggling mostly with left sided radiculopathy and vack pain prior to her surgery on 2/18.   Since her L4-5 laminectomy, however, has had low back pain and right sided radiculopahy with inability to full mobilize/ambulate due to her pain.  She had some pelvic numbness today, she reports but not currently.  No stool incontinence, but has not yet voided today.  No fevers/chills or other neurosensory changes.  Pt and her family have been very worried about her pain.    In the ED, had MRI L spine with post operative findings, small fluid collection, and small recurrent left disc herniation.  No wbc or fever noted. Dr Ortiz has seen and evaluated her in the ED, recommended admission for pain contro.l.  She is accompanied by her  and 3 sisters.   Currently resting after pain medications, but still notes right lower extremity weakness and back pain exacerbated by RLE movement or leg lifting (done in ED).  No current incontinence/saddle anesthesia.     She showed clinical improvement w/ multimodal pain control including decadron,tylenol,  robaxin, gabapentin, oxycodone, and was able to work with PT/OT.    Assessment/Plan:  Medical Complexity: High-1 acute medical problem with high risk threat, at least 3 follow up items     # Intractable lumbar back pain  # Recent L4-5 laminectomy    MRI L-spine:   \"Recent L4-5 laminectomy.  Small recurrent disc herniation in L subarticular zone, causing impingement on the left lateral recess & Associated fluid collection surrounding this, extending along the left side of thecal sac, the left side of the  spinous process, &  into the sub-Q tissues. Mild bilateral neural foraminal narrowing here.   Mild edema in the endplates surrounding R side of L4-5  (reactive Vs postsurgical; less likely infxn).\"    - Spine surgeon Dr Corona has seen/assessed the patient; believes it is muscle spasm/inflammatory    - admitted for pain control, PT/OT    - multi-modal pain control w/  TID Tylenol, Robaxin, Gabapentin, prn oxycodone    - PO Decadron  (recently Rx'd prednisone o/p by spine surg)    - PT, OT evals;  fall precautions    - monitor for any infectious sx; though doubt infxn at this time.  Fluid collection likely post op finding.     # Mild hypokalemia-  replete, monitor        Chronic medical issues:     # Hypothyroidism- continue synthroid  # Obesity:  BMI = 31.  Patient would benefit from lifestyle/dietary modifications       VTE Ppx:  [x]Lovenox  []Heparin SQ  []SCD's  []Eliquis, Xarelto, Pradaxa     CODE Status:  Full code.   present  Dispo/Discharge to:  Likely 1 Days  to:    [x]Home OR  [x]SNF/Rehab     Case was discussed with:  [x]Patient  [x]RN  [x]Case Mgmt [] Consults   []Family        Subjective:      Feeling better today, still with right lower back pain/?spasm and tingling in R toe.  Working with PT, stands up/pivots very slowly with assistance and walker, but needs to stop and rest, due to pain.      Objective:   /84   Pulse 70   Temp 98.1 °F (36.7 °C) (Oral)   Resp 16   Ht 1.727 m (5' 8\")   Wt 93 kg (205 lb)   SpO2 97%   BMI 31.17 kg/m²   Pertinent physical exam findings relative to the acute hospital needs are:  General:    Comfortable, no distress.   AOx4    CV:  RRR, S1, S2, no m/c/r    Lungs:  CTA, no w/r/r.    On  Abdom:  Soft, NTTP, ND.    Ext:  No c/c,    edema.    Right sided LBP, mild weakness in RLE, but able to bare weight, slow pivoting with walker      Intake/Output    In: 120 [P.O.:120]  Out: 350 [Urine:350]       Pertinent Labs:  No results found for the last 90

## 2025-02-28 NOTE — DISCHARGE SUMMARY
DISCHARGE SUMMARY      Patient: April Clements              Sex: female          DOA: 2/26/2025    YOB: 1975      Age:  50 y.o.        LOS:  LOS: 1 day                Admit Date: 2/26/2025  Discharge Date: 3/1/2025    Admission Diagnoses: Intractable pain [R52]  Intractable back pain [M54.9]  Discharge Diagnoses:    Patient Active Problem List   Diagnosis    Intractable back pain    Lumbar disc herniation       Discharge Condition:  Improved, stable      Code Status: Full Code     Hospital Course:          April Clements is a 50 y.o. female with PMHx of obesity, arthritis, hx of Lumbar Laminectomy 2/19/2025 by Dr Irineo Corona.   She presented to [facility] ED 2/26/2025 with intractable back pain with inability to ambulate.  States she was struggling mostly with left sided radiculopathy and vack pain prior to her surgery on 2/18.   Since her L4-5 laminectomy, however, has had low back pain and right sided radiculopahy with inability to full mobilize/ambulate due to her pain.  She had some pelvic numbness today, she reports but not currently.  No stool incontinence, but has not yet voided today.  No fevers/chills or other neurosensory changes.  Pt and her family have been very worried about her pain.    In the ED, had MRI L spine with post operative findings, small fluid collection, and small recurrent left disc herniation.  No wbc or fever noted. Dr Ortiz has seen and evaluated her in the ED, recommended admission for pain contro.l.  She is accompanied by her  and 3 sisters.   Currently resting after pain medications, but still notes right lower extremity weakness and back pain exacerbated by RLE movement or leg lifting (done in ED).  No current incontinence/saddle anesthesia.     She showed clinical improvement w/ multimodal pain control including decadron,tylenol,  robaxin, gabapentin, oxycodone, and was able to work with PT/OT.       Chronic medical issues:     # Hypothyroidism-  There is an associated fluid collection surrounding this extending along the left side of the thecal sac, the left side of the spinous process, and into the subcutaneous tissues. Mild bilateral neural foraminal narrowing is also present at this level. 2. Mild edema in the endplates surrounding the right side of L4-5. This may be reactive or postsurgical. Infection is considered unlikely given the lateralization, but cannot be entirely excluded. 3. Mild left neural femoral narrowing at L3-4.                Consults:     Spine Surgery:   Irineo Corona MD     Discharge Medications:     Current Discharge Medication List        START taking these medications    Details   dexAMETHasone (DECADRON) 4 MG tablet Take 1 tablet by mouth in the morning, at noon, and at bedtime for 3 days, THEN 1 tablet 2 times daily (with meals) for 2 days, THEN 1 tablet daily (with breakfast) for 2 days.  Qty: 15 tablet, Refills: 0   PT HAS AT HOME ALREADY  Start date: 3/1/2025, End date: 3/8/2025      methocarbamol (ROBAXIN) 750 MG tablet Take 2 tablets by mouth in the morning, at noon, and at bedtime for 10 days  Qty: 60 tablet, Refills: 0  Start date: 2/28/2025, End date: 3/10/2025      polyethylene glycol (GLYCOLAX) 17 g packet Take 1 packet by mouth daily as needed for Constipation  Qty: 30 packet, Refills: 0  Start date: 2/28/2025, End date: 3/30/2025      oxyCODONE (OXY-IR) 10 MG immediate release tablet Take 1 tablet by mouth every 4 hours as needed for Pain for up to 3 days. Attempt to reduce frequency, then cut in half. Max Daily Amount: 60 mg  Qty: 30 tablet, Refills: 0  Start date: 2/28/2025, End date: 3/3/2025    Comments: Reduce doses taken as pain becomes manageable  Associated Diagnoses: Lumbar disc herniation; Intractable back pain           CONTINUE these medications which have CHANGED    Details   gabapentin (NEURONTIN) 300 MG capsule Take 1 capsule by mouth 3 times daily for 30 days. Max Daily Amount: 900 mg  Qty: 90

## 2025-02-28 NOTE — CARE COORDINATION
This CM spoke to patient and spouse in room to discuss therapy recommendations of SNF vs HH at discharge. Patient states that she would rather go home with home health. Spouse states he works from home and will be home with patient and also agrees that home with home health is their desired disposition. Choice list given and referrals sent. CM will follow up this afternoon to discuss acceptances and obtain patient choice.

## 2025-02-28 NOTE — PROGRESS NOTES
Physical Therapy Goals:  Continued 2/28/2025 to be met within 7-10 days.  Short Term Goals  Short Term Goal 1: Patient will perform log rolling and supine to/from sit with suupervision/SBA.  Short Term Goal 2: Patient will demonstrate unsupported intact sitting balance EOB with S x 15 for performance of ADL.  Short Term Goal 3: Patient will perform sit to/from stand transfers with RW/S/SBA in prep for progressive gait training.  Short Term Goal 4: Patient will ambulate 150 ft with RW/SBA for increased functional mobility at discharge.  Short Term Goal 5: Patient will demonstrate step nego x 1 with RW/CGA/min A for home re-entry.    []  Patient has met MD susan dunn for d/c home   []  Recommend HH with 24 hour adult care   [x]  Benefit from additional acute PT session(s)      PHYSICAL THERAPY TREATMENT    Patient: April Clements (50 y.o. female)  Date: 2/28/2025  Diagnosis: Intractable pain [R52]  Intractable back pain [M54.9] Intractable back pain      Precautions: Fall Risk,  ,  ,  ,  , Spinal Precautions: No Bending, No Lifting, No Twisting,  ,    PLOF: ambulating with RW since back surgery    ASSESSMENT:  Assessment  Assessment: Pt ambulating in the decker with RW and spouse.  Reciprocal gt pattern, steady slow pace, no LOB or path deviations.  Returned to room and left sitting in the chair per pt request.  Provided with an ice pack over incision.  Continue to ambulate with spouse.  Recommended setting an alarm for pain meds in the evening to prevent sleeping past due time to avoid severe pain in the a.m. again like this morning.  Activity Tolerance: Patient tolerated treatment well  Discharge Recommendations: Home with Home health PT    Progression toward goals:   [x]      Improving appropriately and progressing toward goals  [x]      Improving slowly and progressing toward goals  []      Not making progress toward goals and plan of care will be adjusted     PLAN:  Patient continues to benefit from    ERLINDA COLLADO PTA  Minutes: 11    [facility] AM-PAC® Basic Mobility Inpatient Short Form (6-Clicks) Version 2    How much HELP from another person does the patient currently need…    (If the patient hasn't done an activity recently, how much help from another person do you think he/she would need if he/she tried?)   Total (Total A or Dep)   A Lot  (Mod to Max A)   A Little (Sup or Min A)   None (Mod I to I)   Turning from your back to your side while in a flat bed without using bedrails?   [] 1 [x] 2 [] 3 [] 4   2. Moving from lying on your back to sitting on the side of a flat bed without using bedrails?    [] 1 [x] 2 [] 3 [] 4   3. Moving to and from a bed to a chair (including a wheelchair)?   [] 1 [] 2 [x] 3 [] 4   4. Standing up from a chair using your arms (e.g., wheelchair, or bedside chair)?   [] 1 [] 2 [x] 3 [] 4   5. Walking in hospital room?   [] 1 [] 2 [x] 3 [] 4   6. Climbing 3-5 steps with a railing?+   [] 1 [] 2 [x] 3 [] 4   +If stair climbing cannot be assessed, skip item #6.  Sum responses from items 1-5.     Current research shows that an AM-PAC score of 18 (14 without stairs) or greater is associated with a discharge to the patient's home setting. Based on an AM-PAC score of 16/24 (or **/20 if omitting stairs) and their current functional mobility deficits, it is recommended that the patient have 2-3 sessions per week of Physical Therapy at d/c to increase the patient's independence.

## 2025-02-28 NOTE — PROGRESS NOTES
Care  assisting Care Mgr April Layton RN with Discharge Planning needs for patient.  Referrals sent to area Home Health Agencies (patient's preference) for review and consideration for acceptance for home care needs.    Will follow.

## 2025-02-28 NOTE — PROGRESS NOTES
Patient up walking in hallway with significant other. Patient up with frye walker. Walked from room to end of hallway 2x. Tolerated well. Patient returned back to sit on side of the bed. Call light in reach.

## 2025-02-28 NOTE — PROGRESS NOTES
Received report from ERNA Whitaker. Pt AAOx3, NAD, breathing non labored, on room air, HOB up. IV site clean, dry and intact.  in the room. Bed at the lowest level on lock position, call bell w/i reach.

## 2025-02-28 NOTE — CARE COORDINATION
This CM met with patient and spouse in room to discuss accepting agencies and obtain patient's choice of home health agencies. Patient states that she was walking well in the decker with a walker and she does not think that she needs home health. Patient states that she thinks that outpatient therapy would be best for her and she states she will talk to Dr. Corona about that at her appointment next week. Plan is discharge to home with 's support and physician follow-up and outpatient therapy.  can transport patient home when discharge ready.

## 2025-02-28 NOTE — PROGRESS NOTES
Hospitalist Progress Note      Patient name: April Clements.  MRN: [id number]          PCP: Mikaela Jackson MD     Summary:          April Clements is a 50 y.o. female with PMHx of obesity, arthritis, hx of Lumbar Laminectomy 2/19/2025 by Dr Irineo Corona.   She presented to [facility] ED 2/26/2025 with intractable back pain with inability to ambulate.  States she was struggling mostly with left sided radiculopathy and vack pain prior to her surgery on 2/18.   Since her L4-5 laminectomy, however, has had low back pain and right sided radiculopahy with inability to full mobilize/ambulate due to her pain.  She had some pelvic numbness today, she reports but not currently.  No stool incontinence, but has not yet voided today.  No fevers/chills or other neurosensory changes.  Pt and her family have been very worried about her pain.    In the ED, had MRI L spine with post operative findings, small fluid collection, and small recurrent left disc herniation.  No wbc or fever noted. Dr Ortiz has seen and evaluated her in the ED, recommended admission for pain contro.l.  She is accompanied by her  and 3 sisters.   Currently resting after pain medications, but still notes right lower extremity weakness and back pain exacerbated by RLE movement or leg lifting (done in ED).  No current incontinence/saddle anesthesia.     She showed clinical improvement w/ multimodal pain control including decadron,tylenol,  robaxin, gabapentin, oxycodone, and was able to work with PT/OT.    Assessment/Plan:  Medical Complexity: High-1 acute medical problem with high risk threat, at least 3 follow up items     # Intractable lumbar back pain  # Recent L4-5 laminectomy    MRI L-spine:   \"Recent L4-5 laminectomy.  Small recurrent disc herniation in L subarticular zone, causing impingement on the left lateral recess & Associated fluid collection surrounding this, extending along the left side of thecal sac, the left side of the  non-face-to-face service time visit on the date of service such as  Preparing to see the patient (eg, review of tests)  Obtaining and/or reviewing separately obtained history  Performing a medically necessary appropriate examination and/or evaluation  Counseling and educating the patient/family/caregiver  Ordering medications, tests, or procedures  Referring and communicating with other health care professionals as needed  Documenting clinical information in the electronic or other health record  Independently interpreting results (not reported separately) and communicating results to the patient/family/caregiver  Care coordination and discharge planning with Case Management.        Dear patient April L Starr, if you are reviewing this note and have a question regarding the medical terminology, please bring it with you to your next PCP visit.  Medical notes are meant to be a communication between medical professionals.        Electronically signed by: MAGDA CarpenterC[facility]  02/28/25

## 2025-03-01 VITALS
TEMPERATURE: 97 F | WEIGHT: 205 LBS | DIASTOLIC BLOOD PRESSURE: 77 MMHG | BODY MASS INDEX: 31.07 KG/M2 | SYSTOLIC BLOOD PRESSURE: 124 MMHG | HEIGHT: 68 IN | RESPIRATION RATE: 16 BRPM | HEART RATE: 73 BPM | OXYGEN SATURATION: 100 %

## 2025-03-01 PROCEDURE — 6370000000 HC RX 637 (ALT 250 FOR IP): Performed by: HOSPITALIST

## 2025-03-01 PROCEDURE — 6360000002 HC RX W HCPCS: Performed by: HOSPITALIST

## 2025-03-01 PROCEDURE — 2500000003 HC RX 250 WO HCPCS: Performed by: HOSPITALIST

## 2025-03-01 RX ORDER — LIDOCAINE 4 G/G
2 PATCH TOPICAL DAILY
Status: DISCONTINUED | OUTPATIENT
Start: 2025-03-01 | End: 2025-03-01 | Stop reason: HOSPADM

## 2025-03-01 RX ORDER — DEXAMETHASONE 4 MG/1
TABLET ORAL
Qty: 15 TABLET | Refills: 0 | Status: SHIPPED | OUTPATIENT
Start: 2025-03-01 | End: 2025-03-08

## 2025-03-01 RX ORDER — DEXAMETHASONE 4 MG/1
TABLET ORAL
Qty: 13 TABLET | Refills: 0 | Status: SHIPPED | OUTPATIENT
Start: 2025-03-01 | End: 2025-03-01

## 2025-03-01 RX ORDER — DEXAMETHASONE 4 MG/1
TABLET ORAL
Qty: 13 TABLET | Refills: 0
Start: 2025-03-01 | End: 2025-03-01

## 2025-03-01 RX ADMIN — OXYCODONE HYDROCHLORIDE 10 MG: 5 TABLET ORAL at 11:12

## 2025-03-01 RX ADMIN — GABAPENTIN 300 MG: 300 CAPSULE ORAL at 09:42

## 2025-03-01 RX ADMIN — SODIUM CHLORIDE, PRESERVATIVE FREE 10 ML: 5 INJECTION INTRAVENOUS at 09:53

## 2025-03-01 RX ADMIN — OXYCODONE HYDROCHLORIDE 10 MG: 5 TABLET ORAL at 02:43

## 2025-03-01 RX ADMIN — METHOCARBAMOL 1500 MG: 500 TABLET ORAL at 09:42

## 2025-03-01 RX ADMIN — OXYCODONE HYDROCHLORIDE 10 MG: 5 TABLET ORAL at 06:58

## 2025-03-01 RX ADMIN — DEXAMETHASONE 4 MG: 4 TABLET ORAL at 09:45

## 2025-03-01 RX ADMIN — ACETAMINOPHEN 1000 MG: 500 TABLET ORAL at 09:43

## 2025-03-01 RX ADMIN — ENOXAPARIN SODIUM 40 MG: 100 INJECTION SUBCUTANEOUS at 09:41

## 2025-03-01 ASSESSMENT — PAIN DESCRIPTION - ORIENTATION
ORIENTATION: RIGHT;LEFT;POSTERIOR
ORIENTATION: LEFT;RIGHT;POSTERIOR;LOWER

## 2025-03-01 ASSESSMENT — PAIN SCALES - GENERAL
PAINLEVEL_OUTOF10: 6
PAINLEVEL_OUTOF10: 6
PAINLEVEL_OUTOF10: 5
PAINLEVEL_OUTOF10: 5
PAINLEVEL_OUTOF10: 1

## 2025-03-01 ASSESSMENT — PAIN DESCRIPTION - DESCRIPTORS
DESCRIPTORS: ACHING
DESCRIPTORS: ACHING

## 2025-03-01 ASSESSMENT — PAIN DESCRIPTION - LOCATION
LOCATION: BACK;LEG
LOCATION: BACK;LEG

## 2025-03-01 ASSESSMENT — PAIN SCALES - WONG BAKER: WONGBAKER_NUMERICALRESPONSE: HURTS A LITTLE BIT

## 2025-03-01 NOTE — DISCHARGE INSTRUCTIONS
A message from your hospital medicine team & physician,  Dr. Desirae Covington D.O.      It was a privilege caring for you while you were hospitalized with us.  As physicians specializing in hospital-based care, it is our goal to provide expert, safe & patient-centered care, and to help you feel prepared and empowered with information to ensure a smooth continuation of your care and recovery at home.  I  hope we have addressed your concerns.  Most of all, I wish you the best in your recovery.     You were admitted to the hospital with Intractable post operative back pain, muscle spasms         Please review all of your medications carefully when you get home, and compare them with the list you are receiving today.    New Medications:    1. Continue your medrol dose pack you have at home     2. Tylenol, ROBAXIN( muscle relaxer), Gabapentin  (for nerve pain)-    Take all every 8 hours  3. Oxycodone-  10 mg every 4-6 hours and Wean down as tolerated  4. Miralax + Senna (TWICE per day) -  stool softeners while on oxycodone.        ** NO DRIVING is permitted while taking any Narcotics,  benzodiazepines, or other sedating medications       Please seek medical care from your primary doctor, or return to the ER if urgent,  if you experience:       Uncontrolled Bleeding  (Or black, red stools, vomiting blood)     Fall and hitting your head  (ESPECIALLY if taking a blood thinner)-> will need CT scan of head in ER     Worsening leg or body edema/fluid, shortness of breath, cough  or QUICK weight gain on your scale (over days/week, with swelling)     New or concerning symptoms such as fevers, chills, chest pain/racing heart, uncontrolled nausea/vomiting or diarrhea, urinary symptoms, dizziness     New neurologic /stroke-like symptoms   (numbness, tingling, lightheaded, severe headache, vision changes, gait imbalance, confusion, new tremor or seizure)      ** PREVENTATIVE:   * ALL of us should stay Up To Date on Vaccinations   (Pneumonia, Flu, Covid, Shingles,  Etc)--  Talk to your Primary doctor (PCP) about this   * LADIES over 65 years old:   Stay up to date on DEXA Bone Density Scans (monitors for osteoporosis);  Pap smears & Breast Exams   * GENTLEMAN:  Ensure up to date on Prostate Exams through your doctor or urologist   * Colonoscopies need to be done once 45 yrs old,  unless hx of IBD, cancer (then younger).  Continue screenings every 5-10 years (per GI recommendations).      * Most of us should be taking Vitamin D3 (daily; 1000-2000units)   * If you smoke:   Annual CT Chest to monitor for cancer.    (And please stop smoking!)

## 2025-03-01 NOTE — PROGRESS NOTES
Progress Note POD #      Patient: April Clements               Sex: female          DOA: 2/26/2025         YOB: 1975      Surgery:            LOS: 1 day               Subjective:     No new complaints  Did very well yesterday with few pain medications  Spasms last night    Objective:      Visit Vitals  /81   Pulse 80   Temp 97.9 °F (36.6 °C) (Oral)   Resp 16   Ht 1.727 m (5' 8\")   Wt 93 kg (205 lb)   SpO2 98%   BMI 31.17 kg/m²       Physical Exam:  Neurological: motor strength: 5/5 in lower extremities bilaterally                          sensation: intact to light touch        Intake and Output:  Current Shift:  No intake/output data recorded.  Last three shifts:  02/27 1901 - 03/01 0700  In: 120 [P.O.:120]  Out: -       Lab/Data Reviewed:  Lab Results   Component Value Date/Time    WBC 6.1 02/27/2025 02:22 AM    HGB 12.0 02/27/2025 02:22 AM    HCT 35.5 02/27/2025 02:22 AM     02/27/2025 02:22 AM    MCV 90.3 02/27/2025 02:22 AM     No results found for: \"APTT\"  No results found for: \"INR\", \"PT1\"   Recent Labs     02/26/25  1140 02/27/25  0222   HGB 12.8 12.0           Assessment/Plan     Principal Problem:    Intractable back pain  Active Problems:    Lumbar disc herniation  Resolved Problems:    * No resolved hospital problems. *      1. Stable  2. OOB with PT  3. D/C Planning for home  4. Rely more on muscle relaxers rather than narcotics  5. [place] [facility] [place]

## 2025-03-01 NOTE — PROGRESS NOTES
Patient oriented x4  Plan of care established to maintain patient's pain as yesterday she woke up with pain exacerbation. Brittnee q4..Utilized to manage pain.  Pain 5-6  Ambulated with walker stand by able to log roll in bed independently

## 2025-03-01 NOTE — PLAN OF CARE
Problem: Pain  Goal: Verbalizes/displays adequate comfort level or baseline comfort level  Outcome: Progressing  Flowsheets (Taken 2/26/2025 1950)  Verbalizes/displays adequate comfort level or baseline comfort level:   Encourage patient to monitor pain and request assistance   Assess pain using appropriate pain scale   Administer analgesics based on type and severity of pain and evaluate response   Implement non-pharmacological measures as appropriate and evaluate response     Problem: Safety - Adult  Goal: Free from fall injury  Outcome: Progressing  Flowsheets (Taken 2/26/2025 1950)  Free From Fall Injury:   Instruct family/caregiver on patient safety   Based on caregiver fall risk screen, instruct family/caregiver to ask for assistance with transferring infant if caregiver noted to have fall risk factors     
  Problem: Pain  Goal: Verbalizes/displays adequate comfort level or baseline comfort level  Outcome: Progressing  Flowsheets (Taken 2/27/2025 1149)  Verbalizes/displays adequate comfort level or baseline comfort level:   Encourage patient to monitor pain and request assistance   Administer analgesics based on type and severity of pain and evaluate response   Assess pain using appropriate pain scale     Problem: ABCDS Injury Assessment  Goal: Absence of physical injury  Outcome: Progressing  Flowsheets (Taken 2/27/2025 1149)  Absence of Physical Injury: Implement safety measures based on patient assessment     Problem: Safety - Adult  Goal: Free from fall injury  Outcome: Progressing  Flowsheets (Taken 2/27/2025 1149)  Free From Fall Injury: Instruct family/caregiver on patient safety     
  Problem: Pain  Goal: Verbalizes/displays adequate comfort level or baseline comfort level  Outcome: Progressing  Flowsheets (Taken 2/28/2025 0153)  Verbalizes/displays adequate comfort level or baseline comfort level:   Encourage patient to monitor pain and request assistance   Administer analgesics based on type and severity of pain and evaluate response   Consider cultural and social influences on pain and pain management   Assess pain using appropriate pain scale   Implement non-pharmacological measures as appropriate and evaluate response     Problem: ABCDS Injury Assessment  Goal: Absence of physical injury  Outcome: Progressing  Flowsheets (Taken 2/28/2025 0153)  Absence of Physical Injury: Implement safety measures based on patient assessment     Problem: Safety - Adult  Goal: Free from fall injury  Outcome: Progressing  Flowsheets (Taken 2/28/2025 0153)  Free From Fall Injury: Instruct family/caregiver on patient safety     
Care plan reviewed.  
on patient safety

## 2025-04-18 ENCOUNTER — HOSPITAL ENCOUNTER (OUTPATIENT)
Facility: HOSPITAL | Age: 50
Setting detail: SPECIMEN
Discharge: HOME | End: 2025-04-21

## 2025-04-18 LAB — LABCORP SPECIMEN COLLECTION: NORMAL

## 2025-04-18 PROCEDURE — 99001 SPECIMEN HANDLING PT-LAB: CPT

## 2025-08-05 ENCOUNTER — HOSPITAL ENCOUNTER (OUTPATIENT)
Facility: HOSPITAL | Age: 50
Setting detail: SPECIMEN
Discharge: HOME | End: 2025-08-08

## 2025-08-05 LAB — LABCORP SPECIMEN COLLECTION: NORMAL

## 2025-08-05 PROCEDURE — 99001 SPECIMEN HANDLING PT-LAB: CPT

## (undated) DEVICE — SUTURE MCRYL + SZ 4-0 L27IN ABSRB UD L19MM PS-2 3/8 CIR MCP426H

## (undated) DEVICE — HYPODERMIC SAFETY NEEDLE: Brand: MAGELLAN

## (undated) DEVICE — TIP IU L10CM DIA6.7MM GRN SIL FLX DISP RUMI II

## (undated) DEVICE — GAUZE,SPONGE,8"X4",12PLY,XRAY,STRL,LF: Brand: MEDLINE

## (undated) DEVICE — INTENDED FOR TISSUE SEPARATION, AND OTHER PROCEDURES THAT REQUIRE A SHARP SURGICAL BLADE TO PUNCTURE OR CUT.: Brand: BARD-PARKER ® CARBON RIB-BACK BLADES

## (undated) DEVICE — SUTURE VCRL + SZ 2-0 L36IN ABSRB UD L36MM CT-1 1/2 CIR VCP945H

## (undated) DEVICE — GLOVE SURG SZ 6 THK91MIL LTX FREE SYN POLYISOPRENE ANTI

## (undated) DEVICE — SET,IRRIGATION,CYSTO/TUR,90": Brand: MEDLINE

## (undated) DEVICE — TIP IU L12CM DIA6.7MM ORNG SFT FLX DST END DISP RUMI II

## (undated) DEVICE — LAPAROSCOPIC TROCAR SLEEVE/SINGLE USE: Brand: KII® OPTICAL ACCESS SYSTEM

## (undated) DEVICE — SHEARS ENDOSCP L36CM DIA5MM ULTRASONIC CRV TIP W/ ADV

## (undated) DEVICE — SHEET,DRAPE,40X58,STERILE: Brand: MEDLINE

## (undated) DEVICE — GLOVE SURG SZ 65 THK91MIL LTX FREE SYN POLYISOPRENE

## (undated) DEVICE — TIP IU L8CM DIA6.7MM BLU SIL FLX DISP RUMI II

## (undated) DEVICE — SOLUTION IRRIG 3000ML LAC R FLX CONT

## (undated) DEVICE — TROCAR: Brand: KII® SLEEVE

## (undated) DEVICE — TIP IU L6CM DIA6.7MM WHT SIL FLX DISP RUMI II

## (undated) DEVICE — SUTURE VCRL SZ 0 L54IN ABSRB UD POLYGLACTIN 910 COAT BRAID J608H

## (undated) DEVICE — GYN LAPAROSCOPY PACK: Brand: CARDINAL HEALTH

## (undated) DEVICE — SYRINGE CTRL M LUER LCK RNG AND FNGR RNGS 10ML

## (undated) DEVICE — SUTURE VCRL SZ 0 L36IN ABSRB UD CT-1 L36MM 1/2 CIR TAPR PNT VCP946H

## (undated) DEVICE — TABLE COVER: Brand: CONVERTORS

## (undated) DEVICE — PAD BD CONVOLUTED FOAM

## (undated) DEVICE — APPLICATOR MEDICATED 26 CC SOLUTION HI LT ORNG CHLORAPREP

## (undated) DEVICE — SET TBNG DISP TIP FOR AHTO

## (undated) DEVICE — YANKAUER,FLEXIBLE HANDLE,REGLR CAPACITY: Brand: MEDLINE INDUSTRIES, INC.

## (undated) DEVICE — TUBING, SUCTION, 1/4" X 12', STRAIGHT: Brand: MEDLINE

## (undated) DEVICE — LIQUIBAND RAPID ADHESIVE 36/CS 0.8ML: Brand: MEDLINE

## (undated) DEVICE — NEEDLE COUNTER: Brand: DEROYAL

## (undated) DEVICE — SOLUTION IV D10 1000 ML INJ BG

## (undated) DEVICE — ENDOCUT SCISSOR TIP, DISPOSABLE: Brand: RENEW

## (undated) DEVICE — ADHESIVE SKIN CLOSURE 0.7CC TOP MICROBIAL APPL DERMBND ADV

## (undated) DEVICE — INSUFFLATION NEEDLE TO ESTABLISH PNEUMOPERITONEUM.: Brand: INSUFFLATION NEEDLE

## (undated) DEVICE — POUCH, INSTRUMENT, 3POCKET, INVISISHIELD: Brand: MEDLINE